# Patient Record
Sex: MALE | Race: WHITE | NOT HISPANIC OR LATINO | Employment: OTHER | URBAN - METROPOLITAN AREA
[De-identification: names, ages, dates, MRNs, and addresses within clinical notes are randomized per-mention and may not be internally consistent; named-entity substitution may affect disease eponyms.]

---

## 2017-03-15 ENCOUNTER — COMMUNICATION - HEALTHEAST (OUTPATIENT)
Dept: FAMILY MEDICINE | Facility: CLINIC | Age: 57
End: 2017-03-15

## 2017-03-15 DIAGNOSIS — I10 ESSENTIAL HYPERTENSION: ICD-10-CM

## 2017-07-18 ENCOUNTER — COMMUNICATION - HEALTHEAST (OUTPATIENT)
Dept: FAMILY MEDICINE | Facility: CLINIC | Age: 57
End: 2017-07-18

## 2017-07-18 DIAGNOSIS — I10 ESSENTIAL HYPERTENSION: ICD-10-CM

## 2017-10-10 ENCOUNTER — OFFICE VISIT - HEALTHEAST (OUTPATIENT)
Dept: FAMILY MEDICINE | Facility: CLINIC | Age: 57
End: 2017-10-10

## 2017-10-10 DIAGNOSIS — E66.3 OVERWEIGHT: ICD-10-CM

## 2017-10-10 DIAGNOSIS — K43.9 VENTRAL HERNIA: ICD-10-CM

## 2017-10-10 DIAGNOSIS — Z00.00 HEALTH MAINTENANCE EXAMINATION: ICD-10-CM

## 2017-10-10 DIAGNOSIS — I10 ESSENTIAL HYPERTENSION: ICD-10-CM

## 2017-10-10 DIAGNOSIS — E78.00 PURE HYPERCHOLESTEROLEMIA: ICD-10-CM

## 2017-10-10 LAB
CHOLEST SERPL-MCNC: 164 MG/DL
FASTING STATUS PATIENT QL REPORTED: YES
HDLC SERPL-MCNC: 52 MG/DL
LDLC SERPL CALC-MCNC: 93 MG/DL
PSA SERPL-MCNC: 0.4 NG/ML (ref 0–3.5)
TRIGL SERPL-MCNC: 97 MG/DL

## 2017-10-10 ASSESSMENT — MIFFLIN-ST. JEOR: SCORE: 1667.78

## 2017-10-10 NOTE — ASSESSMENT & PLAN NOTE
Hypertension is unchanged.  Continue current treatment regimen.  Weight loss.  Regular aerobic exercise.  Continue current medications.  Blood pressure will be reassessed at the next regular appointment.

## 2017-10-10 NOTE — ASSESSMENT & PLAN NOTE
Lipid abnormalities are unchanged.  Pharmacotherapy as ordered.  Lipids will be reassessed in 1 year.

## 2017-10-13 ENCOUNTER — AMBULATORY - HEALTHEAST (OUTPATIENT)
Dept: FAMILY MEDICINE | Facility: CLINIC | Age: 57
End: 2017-10-13

## 2017-10-13 DIAGNOSIS — E87.6 HYPOKALEMIA: ICD-10-CM

## 2017-10-19 ENCOUNTER — OFFICE VISIT - HEALTHEAST (OUTPATIENT)
Dept: SURGERY | Facility: CLINIC | Age: 57
End: 2017-10-19

## 2017-10-19 DIAGNOSIS — K42.9 UMBILICAL HERNIA WITHOUT OBSTRUCTION OR GANGRENE: ICD-10-CM

## 2017-10-19 DIAGNOSIS — M62.08 DIASTASIS RECTI: ICD-10-CM

## 2017-11-03 ENCOUNTER — AMBULATORY - HEALTHEAST (OUTPATIENT)
Dept: LAB | Facility: CLINIC | Age: 57
End: 2017-11-03

## 2017-11-03 DIAGNOSIS — E87.6 HYPOKALEMIA: ICD-10-CM

## 2018-02-06 ENCOUNTER — COMMUNICATION - HEALTHEAST (OUTPATIENT)
Dept: FAMILY MEDICINE | Facility: CLINIC | Age: 58
End: 2018-02-06

## 2018-10-17 ENCOUNTER — COMMUNICATION - HEALTHEAST (OUTPATIENT)
Dept: FAMILY MEDICINE | Facility: CLINIC | Age: 58
End: 2018-10-17

## 2018-10-17 DIAGNOSIS — E78.2 MIXED HYPERLIPIDEMIA: ICD-10-CM

## 2018-10-18 ENCOUNTER — COMMUNICATION - HEALTHEAST (OUTPATIENT)
Dept: FAMILY MEDICINE | Facility: CLINIC | Age: 58
End: 2018-10-18

## 2018-10-18 DIAGNOSIS — I10 ESSENTIAL HYPERTENSION: ICD-10-CM

## 2018-11-29 ENCOUNTER — RECORDS - HEALTHEAST (OUTPATIENT)
Dept: ADMINISTRATIVE | Facility: OTHER | Age: 58
End: 2018-11-29

## 2018-11-30 ENCOUNTER — OFFICE VISIT - HEALTHEAST (OUTPATIENT)
Dept: FAMILY MEDICINE | Facility: CLINIC | Age: 58
End: 2018-11-30

## 2018-11-30 DIAGNOSIS — E78.2 MIXED HYPERLIPIDEMIA: ICD-10-CM

## 2018-11-30 DIAGNOSIS — R73.01 IMPAIRED FASTING GLUCOSE: ICD-10-CM

## 2018-11-30 DIAGNOSIS — Z00.00 ENCOUNTER FOR ROUTINE HISTORY AND PHYSICAL EXAM FOR MALE: ICD-10-CM

## 2018-11-30 DIAGNOSIS — E78.00 PURE HYPERCHOLESTEROLEMIA: ICD-10-CM

## 2018-11-30 DIAGNOSIS — Z12.11 SCREENING FOR COLORECTAL CANCER: ICD-10-CM

## 2018-11-30 DIAGNOSIS — Z12.5 SCREENING FOR MALIGNANT NEOPLASM OF PROSTATE: ICD-10-CM

## 2018-11-30 DIAGNOSIS — I10 ESSENTIAL HYPERTENSION, BENIGN: ICD-10-CM

## 2018-11-30 DIAGNOSIS — Z12.12 SCREENING FOR COLORECTAL CANCER: ICD-10-CM

## 2018-11-30 DIAGNOSIS — K64.4 EXTERNAL HEMORRHOIDS: ICD-10-CM

## 2018-11-30 LAB
ALBUMIN SERPL-MCNC: 4.3 G/DL (ref 3.5–5)
ALP SERPL-CCNC: 46 U/L (ref 45–120)
ALT SERPL W P-5'-P-CCNC: 35 U/L (ref 0–45)
ANION GAP SERPL CALCULATED.3IONS-SCNC: 12 MMOL/L (ref 5–18)
AST SERPL W P-5'-P-CCNC: 26 U/L (ref 0–40)
BILIRUB SERPL-MCNC: 1.8 MG/DL (ref 0–1)
BUN SERPL-MCNC: 14 MG/DL (ref 8–22)
CALCIUM SERPL-MCNC: 10 MG/DL (ref 8.5–10.5)
CHLORIDE BLD-SCNC: 100 MMOL/L (ref 98–107)
CHOLEST SERPL-MCNC: 185 MG/DL
CO2 SERPL-SCNC: 28 MMOL/L (ref 22–31)
CREAT SERPL-MCNC: 0.78 MG/DL (ref 0.7–1.3)
ERYTHROCYTE [DISTWIDTH] IN BLOOD BY AUTOMATED COUNT: 12.2 % (ref 11–14.5)
FASTING STATUS PATIENT QL REPORTED: YES
GFR SERPL CREATININE-BSD FRML MDRD: >60 ML/MIN/1.73M2
GLUCOSE BLD-MCNC: 104 MG/DL (ref 70–125)
HCT VFR BLD AUTO: 47.2 % (ref 40–54)
HDLC SERPL-MCNC: 60 MG/DL
HGB BLD-MCNC: 16.4 G/DL (ref 14–18)
LDLC SERPL CALC-MCNC: 97 MG/DL
MCH RBC QN AUTO: 31.8 PG (ref 27–34)
MCHC RBC AUTO-ENTMCNC: 34.8 G/DL (ref 32–36)
MCV RBC AUTO: 91 FL (ref 80–100)
PLATELET # BLD AUTO: 237 THOU/UL (ref 140–440)
PMV BLD AUTO: 7.7 FL (ref 7–10)
POTASSIUM BLD-SCNC: 3.6 MMOL/L (ref 3.5–5)
PROT SERPL-MCNC: 7.5 G/DL (ref 6–8)
PSA SERPL-MCNC: 0.4 NG/ML (ref 0–3.5)
RBC # BLD AUTO: 5.16 MILL/UL (ref 4.4–6.2)
SODIUM SERPL-SCNC: 140 MMOL/L (ref 136–145)
TRIGL SERPL-MCNC: 141 MG/DL
WBC: 5.4 THOU/UL (ref 4–11)

## 2018-11-30 ASSESSMENT — MIFFLIN-ST. JEOR: SCORE: 1689.89

## 2018-12-03 LAB — HBA1C MFR BLD: 5.7 % (ref 3.5–6)

## 2019-03-12 ENCOUNTER — COMMUNICATION - HEALTHEAST (OUTPATIENT)
Dept: FAMILY MEDICINE | Facility: CLINIC | Age: 59
End: 2019-03-12

## 2019-04-01 ENCOUNTER — COMMUNICATION - HEALTHEAST (OUTPATIENT)
Dept: FAMILY MEDICINE | Facility: CLINIC | Age: 59
End: 2019-04-01

## 2019-04-01 DIAGNOSIS — I10 ESSENTIAL HYPERTENSION, BENIGN: ICD-10-CM

## 2019-04-03 ENCOUNTER — AMBULATORY - HEALTHEAST (OUTPATIENT)
Dept: LAB | Facility: CLINIC | Age: 59
End: 2019-04-03

## 2019-04-03 DIAGNOSIS — I10 ESSENTIAL HYPERTENSION, BENIGN: ICD-10-CM

## 2019-04-03 LAB
ANION GAP SERPL CALCULATED.3IONS-SCNC: 10 MMOL/L (ref 5–18)
BUN SERPL-MCNC: 13 MG/DL (ref 8–22)
CALCIUM SERPL-MCNC: 9.7 MG/DL (ref 8.5–10.5)
CHLORIDE BLD-SCNC: 106 MMOL/L (ref 98–107)
CO2 SERPL-SCNC: 27 MMOL/L (ref 22–31)
CREAT SERPL-MCNC: 0.89 MG/DL (ref 0.7–1.3)
GFR SERPL CREATININE-BSD FRML MDRD: >60 ML/MIN/1.73M2
GLUCOSE BLD-MCNC: 102 MG/DL (ref 70–125)
POTASSIUM BLD-SCNC: 4.1 MMOL/L (ref 3.5–5)
SODIUM SERPL-SCNC: 143 MMOL/L (ref 136–145)

## 2019-06-03 ENCOUNTER — OFFICE VISIT - HEALTHEAST (OUTPATIENT)
Dept: FAMILY MEDICINE | Facility: CLINIC | Age: 59
End: 2019-06-03

## 2019-06-03 DIAGNOSIS — I10 ESSENTIAL HYPERTENSION, BENIGN: ICD-10-CM

## 2019-06-18 ENCOUNTER — OFFICE VISIT - HEALTHEAST (OUTPATIENT)
Dept: FAMILY MEDICINE | Facility: CLINIC | Age: 59
End: 2019-06-18

## 2019-06-18 DIAGNOSIS — J30.2 SEASONAL ALLERGIC RHINITIS, UNSPECIFIED TRIGGER: ICD-10-CM

## 2019-06-18 DIAGNOSIS — R05.8 COUGH PRESENT FOR GREATER THAN 3 WEEKS: ICD-10-CM

## 2019-06-18 LAB
BASOPHILS # BLD AUTO: 0 THOU/UL (ref 0–0.2)
BASOPHILS NFR BLD AUTO: 1 % (ref 0–2)
EOSINOPHIL # BLD AUTO: 0.3 THOU/UL (ref 0–0.4)
EOSINOPHIL NFR BLD AUTO: 5 % (ref 0–6)
ERYTHROCYTE [DISTWIDTH] IN BLOOD BY AUTOMATED COUNT: 12 % (ref 11–14.5)
HCT VFR BLD AUTO: 44.2 % (ref 40–54)
HGB BLD-MCNC: 15.2 G/DL (ref 14–18)
LYMPHOCYTES # BLD AUTO: 1.8 THOU/UL (ref 0.8–4.4)
LYMPHOCYTES NFR BLD AUTO: 30 % (ref 20–40)
MCH RBC QN AUTO: 31.6 PG (ref 27–34)
MCHC RBC AUTO-ENTMCNC: 34.3 G/DL (ref 32–36)
MCV RBC AUTO: 92 FL (ref 80–100)
MONOCYTES # BLD AUTO: 0.6 THOU/UL (ref 0–0.9)
MONOCYTES NFR BLD AUTO: 10 % (ref 2–10)
NEUTROPHILS # BLD AUTO: 3.4 THOU/UL (ref 2–7.7)
NEUTROPHILS NFR BLD AUTO: 55 % (ref 50–70)
PLATELET # BLD AUTO: 246 THOU/UL (ref 140–440)
PMV BLD AUTO: 7.4 FL (ref 7–10)
RBC # BLD AUTO: 4.8 MILL/UL (ref 4.4–6.2)
WBC: 6.2 THOU/UL (ref 4–11)

## 2019-06-18 ASSESSMENT — MIFFLIN-ST. JEOR: SCORE: 1680.82

## 2019-12-15 ENCOUNTER — COMMUNICATION - HEALTHEAST (OUTPATIENT)
Dept: FAMILY MEDICINE | Facility: CLINIC | Age: 59
End: 2019-12-15

## 2019-12-15 DIAGNOSIS — E78.2 MIXED HYPERLIPIDEMIA: ICD-10-CM

## 2019-12-18 ENCOUNTER — OFFICE VISIT - HEALTHEAST (OUTPATIENT)
Dept: FAMILY MEDICINE | Facility: CLINIC | Age: 59
End: 2019-12-18

## 2019-12-18 DIAGNOSIS — E78.00 PURE HYPERCHOLESTEROLEMIA: ICD-10-CM

## 2019-12-18 DIAGNOSIS — R73.01 IMPAIRED FASTING GLUCOSE: ICD-10-CM

## 2019-12-18 DIAGNOSIS — Z12.5 SCREENING FOR PROSTATE CANCER: ICD-10-CM

## 2019-12-18 DIAGNOSIS — M77.11 LATERAL EPICONDYLITIS OF RIGHT ELBOW: ICD-10-CM

## 2019-12-18 DIAGNOSIS — Z00.00 HEALTHCARE MAINTENANCE: ICD-10-CM

## 2019-12-18 DIAGNOSIS — I10 ESSENTIAL HYPERTENSION, BENIGN: ICD-10-CM

## 2019-12-18 DIAGNOSIS — E66.3 OVERWEIGHT: ICD-10-CM

## 2019-12-18 DIAGNOSIS — E88.810 METABOLIC SYNDROME X: ICD-10-CM

## 2019-12-18 DIAGNOSIS — Z23 NEED FOR SHINGLES VACCINE: ICD-10-CM

## 2019-12-18 DIAGNOSIS — E78.2 MIXED HYPERLIPIDEMIA: ICD-10-CM

## 2019-12-18 LAB
ALBUMIN SERPL-MCNC: 4.1 G/DL (ref 3.5–5)
ALP SERPL-CCNC: 56 U/L (ref 45–120)
ALT SERPL W P-5'-P-CCNC: 31 U/L (ref 0–45)
ANION GAP SERPL CALCULATED.3IONS-SCNC: 10 MMOL/L (ref 5–18)
AST SERPL W P-5'-P-CCNC: 23 U/L (ref 0–40)
BILIRUB SERPL-MCNC: 1.7 MG/DL (ref 0–1)
BUN SERPL-MCNC: 14 MG/DL (ref 8–22)
CALCIUM SERPL-MCNC: 9.2 MG/DL (ref 8.5–10.5)
CHLORIDE BLD-SCNC: 105 MMOL/L (ref 98–107)
CHOLEST SERPL-MCNC: 179 MG/DL
CO2 SERPL-SCNC: 25 MMOL/L (ref 22–31)
CREAT SERPL-MCNC: 0.9 MG/DL (ref 0.7–1.3)
FASTING STATUS PATIENT QL REPORTED: YES
GFR SERPL CREATININE-BSD FRML MDRD: >60 ML/MIN/1.73M2
GLUCOSE BLD-MCNC: 111 MG/DL (ref 70–125)
HBA1C MFR BLD: 5.9 % (ref 3.5–6)
HDLC SERPL-MCNC: 60 MG/DL
LDLC SERPL CALC-MCNC: 93 MG/DL
POTASSIUM BLD-SCNC: 3.8 MMOL/L (ref 3.5–5)
PROT SERPL-MCNC: 7 G/DL (ref 6–8)
PSA SERPL-MCNC: 0.5 NG/ML (ref 0–3.5)
SODIUM SERPL-SCNC: 140 MMOL/L (ref 136–145)
TRIGL SERPL-MCNC: 131 MG/DL

## 2019-12-18 ASSESSMENT — MIFFLIN-ST. JEOR: SCORE: 1704.41

## 2019-12-20 ENCOUNTER — COMMUNICATION - HEALTHEAST (OUTPATIENT)
Dept: FAMILY MEDICINE | Facility: CLINIC | Age: 59
End: 2019-12-20

## 2020-06-16 ENCOUNTER — COMMUNICATION - HEALTHEAST (OUTPATIENT)
Dept: FAMILY MEDICINE | Facility: CLINIC | Age: 60
End: 2020-06-16

## 2020-06-16 DIAGNOSIS — E78.2 MIXED HYPERLIPIDEMIA: ICD-10-CM

## 2020-12-01 ENCOUNTER — OFFICE VISIT - HEALTHEAST (OUTPATIENT)
Dept: FAMILY MEDICINE | Facility: CLINIC | Age: 60
End: 2020-12-01

## 2020-12-01 DIAGNOSIS — Z12.5 SCREENING FOR PROSTATE CANCER: ICD-10-CM

## 2020-12-01 DIAGNOSIS — Z12.11 SCREEN FOR COLON CANCER: ICD-10-CM

## 2020-12-01 DIAGNOSIS — R73.01 IMPAIRED FASTING GLUCOSE: ICD-10-CM

## 2020-12-01 DIAGNOSIS — Z11.59 ENCOUNTER FOR HEPATITIS C SCREENING TEST FOR LOW RISK PATIENT: ICD-10-CM

## 2020-12-01 DIAGNOSIS — Z80.7 FAMILY HISTORY OF LYMPHOMA: ICD-10-CM

## 2020-12-01 DIAGNOSIS — E78.00 PURE HYPERCHOLESTEROLEMIA: ICD-10-CM

## 2020-12-01 DIAGNOSIS — Z00.01 ENCOUNTER FOR GENERAL ADULT MEDICAL EXAMINATION WITH ABNORMAL FINDINGS: ICD-10-CM

## 2020-12-01 DIAGNOSIS — Z13.220 LIPID SCREENING: ICD-10-CM

## 2020-12-01 DIAGNOSIS — I10 ESSENTIAL HYPERTENSION, BENIGN: ICD-10-CM

## 2020-12-01 LAB
ALBUMIN SERPL-MCNC: 4.3 G/DL (ref 3.5–5)
ALP SERPL-CCNC: 52 U/L (ref 45–120)
ALT SERPL W P-5'-P-CCNC: 28 U/L (ref 0–45)
ANION GAP SERPL CALCULATED.3IONS-SCNC: 11 MMOL/L (ref 5–18)
AST SERPL W P-5'-P-CCNC: 21 U/L (ref 0–40)
BILIRUB SERPL-MCNC: 1.7 MG/DL (ref 0–1)
BUN SERPL-MCNC: 13 MG/DL (ref 8–22)
CALCIUM SERPL-MCNC: 9.1 MG/DL (ref 8.5–10.5)
CHLORIDE BLD-SCNC: 108 MMOL/L (ref 98–107)
CHOLEST SERPL-MCNC: 173 MG/DL
CO2 SERPL-SCNC: 22 MMOL/L (ref 22–31)
CREAT SERPL-MCNC: 0.91 MG/DL (ref 0.7–1.3)
ERYTHROCYTE [DISTWIDTH] IN BLOOD BY AUTOMATED COUNT: 12.2 % (ref 11–14.5)
FASTING STATUS PATIENT QL REPORTED: YES
GFR SERPL CREATININE-BSD FRML MDRD: >60 ML/MIN/1.73M2
GLUCOSE BLD-MCNC: 110 MG/DL (ref 70–125)
HBA1C MFR BLD: 5.8 %
HCT VFR BLD AUTO: 45 % (ref 40–54)
HDLC SERPL-MCNC: 64 MG/DL
HGB BLD-MCNC: 15.4 G/DL (ref 14–18)
LDLC SERPL CALC-MCNC: 82 MG/DL
MCH RBC QN AUTO: 31.8 PG (ref 27–34)
MCHC RBC AUTO-ENTMCNC: 34.2 G/DL (ref 32–36)
MCV RBC AUTO: 93 FL (ref 80–100)
PLATELET # BLD AUTO: 244 THOU/UL (ref 140–440)
PMV BLD AUTO: 7.3 FL (ref 7–10)
POTASSIUM BLD-SCNC: 4 MMOL/L (ref 3.5–5)
PROT SERPL-MCNC: 6.9 G/DL (ref 6–8)
PSA SERPL-MCNC: 0.5 NG/ML (ref 0–3.5)
RBC # BLD AUTO: 4.83 MILL/UL (ref 4.4–6.2)
SODIUM SERPL-SCNC: 141 MMOL/L (ref 136–145)
TRIGL SERPL-MCNC: 136 MG/DL
WBC: 4.9 THOU/UL (ref 4–11)

## 2020-12-01 ASSESSMENT — MIFFLIN-ST. JEOR: SCORE: 1700.78

## 2020-12-02 LAB — HCV AB SERPL QL IA: NEGATIVE

## 2021-02-22 ENCOUNTER — COMMUNICATION - HEALTHEAST (OUTPATIENT)
Dept: FAMILY MEDICINE | Facility: CLINIC | Age: 61
End: 2021-02-22

## 2021-02-22 DIAGNOSIS — I10 ESSENTIAL HYPERTENSION, BENIGN: ICD-10-CM

## 2021-03-13 ENCOUNTER — COMMUNICATION - HEALTHEAST (OUTPATIENT)
Dept: FAMILY MEDICINE | Facility: CLINIC | Age: 61
End: 2021-03-13

## 2021-03-13 DIAGNOSIS — E78.2 MIXED HYPERLIPIDEMIA: ICD-10-CM

## 2021-03-13 RX ORDER — ATORVASTATIN CALCIUM 20 MG/1
TABLET, FILM COATED ORAL
Qty: 90 TABLET | Refills: 2 | Status: SHIPPED | OUTPATIENT
Start: 2021-03-13 | End: 2021-07-30

## 2021-04-22 ENCOUNTER — AMBULATORY - HEALTHEAST (OUTPATIENT)
Dept: NURSING | Facility: CLINIC | Age: 61
End: 2021-04-22

## 2021-05-13 ENCOUNTER — COMMUNICATION - HEALTHEAST (OUTPATIENT)
Dept: FAMILY MEDICINE | Facility: CLINIC | Age: 61
End: 2021-05-13

## 2021-05-13 ENCOUNTER — AMBULATORY - HEALTHEAST (OUTPATIENT)
Dept: NURSING | Facility: CLINIC | Age: 61
End: 2021-05-13

## 2021-05-13 DIAGNOSIS — I10 ESSENTIAL HYPERTENSION, BENIGN: ICD-10-CM

## 2021-05-14 RX ORDER — LOSARTAN POTASSIUM AND HYDROCHLOROTHIAZIDE 12.5; 5 MG/1; MG/1
1 TABLET ORAL DAILY
Qty: 90 TABLET | Refills: 2 | Status: SHIPPED | OUTPATIENT
Start: 2021-05-14 | End: 2021-07-30

## 2021-05-27 NOTE — TELEPHONE ENCOUNTER
Refill Approved    Rx renewed per Medication Renewal Policy. Medication was last renewed on 11/30/2018.           Vazquez Jorgensen, [facility] Triage/Med Refill 4/2/2019     Requested Prescriptions   Pending Prescriptions Disp Refills     lisinopril-hydrochlorothiazide (PRINZIDE,ZESTORETIC) 10-12.5 mg per tablet [Pharmacy Med Name: LISINOPRIL-HCTZ 10/12.5MG TABLETS] 30 tablet 0     Sig: TAKE 1 TABLET BY MOUTH DAILY    Diuretics/Combination Diuretics Refill Protocol  Passed - 4/1/2019  5:21 PM       Passed - Visit with PCP or prescribing provider visit in past 12 months    Last office visit with prescriber/PCP: 9/9/2016 Carleen Alan DO OR same dept: Visit date not found OR same specialty: 9/9/2016 Carleen Alan DO  Last physical: 11/30/2018 Last MTM visit: Visit date not found   Next visit within 3 mo: Visit date not found  Next physical within 3 mo: Visit date not found  Prescriber OR PCP: Carleen Alan DO  Last diagnosis associated with med order: There are no diagnoses linked to this encounter.  If protocol passes may refill for 12 months if within 3 months of last provider visit (or a total of 15 months).            Passed - Serum Potassium in past 12 months     Lab Results   Component Value Date    Potassium 3.6 11/30/2018            Passed - Serum Sodium in past 12 months     Lab Results   Component Value Date    Sodium 140 11/30/2018            Passed - Blood pressure on file in past 12 months    BP Readings from Last 1 Encounters:   03/15/19 126/83            Passed - Serum Creatinine in past 12 months     Creatinine   Date Value Ref Range Status   11/30/2018 0.78 0.70 - 1.30 mg/dL Final

## 2021-05-29 NOTE — PROGRESS NOTES
Assessment:     1. Cough present for greater than 3 weeks  HM1(CBC and Differential)    XR Chest 2 Views    HM1 (CBC with Diff)   2. Seasonal allergic rhinitis, unspecified trigger  loratadine (CLARITIN) 10 mg tablet    fluticasone propionate (FLONASE) 50 mcg/actuation nasal spray     An x-ray was done and reviewed personally by me.  She does not show any acute changes.    Suspicion that this must be seasonal allergies as patient has had some intermittent symptoms starting from March.  At this time trial of allergy medication and nasal spray as above.      Plan:      The diagnosis was discussed with the patient and evaluation and treatment plans outlined.     Subjective:      Kirby Mandujano is a  male who presents for evaluation of   Chief Complaint   Patient presents with     Cough     Still has cough, X1 month, started BP medications and has concerns      Patient has had cough for the last few months.  In the month of November his medication for blood pressure were changed.  Lisinopril was added to his regimen.  He believes this has been the culprit medication causing chronic cough.  Denies any fever, loss of weight or appetite.  Otherwise feels good.     Since I last saw him 2 weeks ago, his cough is now dry and hacking and sometimes he goes into spasms where he continues to have hacking episodes.  This is usually nonproductive.  At the end of the cough occasionally he might hear a wheeze.    His lisinopril was changed to losartan at the last visit.    The following portions of the patient's history were reviewed and updated as appropriate: allergies, current medications, past family history, past medical history, past social history, past surgical history and problem list.  No Known Allergies    Current Outpatient Medications on File Prior to Visit   Medication Sig Dispense Refill     aspirin 81 MG EC tablet Take 81 mg by mouth daily.       atorvastatin (LIPITOR) 20 MG tablet TAKE 1 TABLET(20 MG) BY MOUTH  AT BEDTIME. 90 tablet 4     losartan-hydrochlorothiazide (HYZAAR) 50-12.5 mg per tablet Take 1 tablet by mouth daily. 30 tablet 11     No current facility-administered medications on file prior to visit.        Patient Active Problem List   Diagnosis     Hypercholesterolemia     Benign Essential Hypertension     Overweight     External hemorrhoids     Impaired fasting glucose       Past Medical History:   Diagnosis Date     Hernia, ventral 10/19/2017       Past Surgical History:   Procedure Laterality Date     [place] REMOVAL OF SPERM DUCT(S)      Description: Surgery Of Male Genitalia Vasectomy;  Recorded: 11/17/2008;     WISDOM TOOTH EXTRACTION         Family History   Problem Relation Age of Onset     Hypertension Mother      Prostate cancer Father      Hypertension Father      Diabetes type II Son        Social History     Socioeconomic History     Marital status: Single     Spouse name:       Number of children: None     Years of education: None     Highest education level: None   Occupational History     Occupation:  manager      Employer: [employer]     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   Social Needs     Financial resource strain: None     Food insecurity:     Worry: None     Inability: None     Transportation needs:     Medical: None     Non-medical: None   Tobacco Use     Smoking status: Never Smoker     Smokeless tobacco: Never Used   Substance and Sexual Activity     Alcohol use: Yes     Alcohol/week: 1.0 oz     Types: 2 Standard drinks or equivalent per week     Drug use: No     Sexual activity: Yes     Partners: Female   Lifestyle     Physical activity:     Days per week: None     Minutes per session: None     Stress: None   Relationships     Social connections:     Talks on phone: None     Gets together: None     Attends Christian service: None     Active member of club or organization: None     Attends meetings of clubs or organizations: None     Relationship status: None     Intimate partner violence:      "Fear of current or ex partner: None     Emotionally abused: None     Physically abused: None     Forced sexual activity: None   Other Topics Concern     None   Social History Narrative               Review of Systems  Constitutional: negative  Cardiovascular: negative  Gastrointestinal: negative       Objective:   /83 (Patient Site: Left Arm, Patient Position: Sitting, Cuff Size: Adult Large)   Pulse (!) 55   Ht 5' 9\" (1.753 m)   Wt 193 lb (87.5 kg)   SpO2 96%   BMI 28.50 kg/m      General:Healthy, alert and in no acute distress  Head:  NCAT w/o lesions or tenderness  Eyes: conjunctivae/corneas clear. PERRL, EOM's intact. Fundi benign  Ears: normal TM's and external ear canals bilateral  Sinus tender: negative  Nose: Slightly turbinates  Mouth: lips, mucosa, and tongue normal. Teeth and gums normal. No tonsillar endangerment or  erythema of pharynx, some postnasal drip is noted.  Neck: supple, symmetrical, trachea midline.  Lungs: clear to auscultation bilaterally  Heart: RRR, No murmurs    "

## 2021-05-29 NOTE — PROGRESS NOTES
Assessment:     1. Benign Essential Hypertension  losartan-hydrochlorothiazide (HYZAAR) 50-12.5 mg per tablet     Likely from lisinopril.  However I discussed with the patient that if his symptoms do not resolve the need to do further work-up.  Patient is agreeable     Plan:      The diagnosis was discussed with the patient and evaluation and treatment plans outlined.     Subjective:      Kirby Mandujano is a  male who presents for evaluation of   Chief Complaint   Patient presents with     Cough     Pt here today to discuss persistant cough- Pt believes could be related to a medication he is taking     Patient has had cough for the last few months.  In the month of November his medication for blood pressure were changed.  Lisinopril was added to his regimen.  He believes this has been the culprit medication causing chronic cough.  Denies any fever, loss of weight or appetite.  Otherwise feels good.  Cough is more like clearing of throat.    The following portions of the patient's history were reviewed and updated as appropriate: allergies, current medications, past family history, past medical history, past social history, past surgical history and problem list.  No Known Allergies    Current Outpatient Medications on File Prior to Visit   Medication Sig Dispense Refill     aspirin 81 MG EC tablet Take 81 mg by mouth daily.       atorvastatin (LIPITOR) 20 MG tablet TAKE 1 TABLET(20 MG) BY MOUTH AT BEDTIME. 90 tablet 4     No current facility-administered medications on file prior to visit.        Patient Active Problem List   Diagnosis     Hypercholesterolemia     Benign Essential Hypertension     Overweight     External hemorrhoids     Impaired fasting glucose       Past Medical History:   Diagnosis Date     Hernia, ventral 10/19/2017       Past Surgical History:   Procedure Laterality Date     [place] REMOVAL OF SPERM DUCT(S)      Description: Surgery Of Male Genitalia Vasectomy;  Recorded: 11/17/2008;     WISDOM  TOOTH EXTRACTION         Family History   Problem Relation Age of Onset     Hypertension Mother      Prostate cancer Father      Hypertension Father      Diabetes type II Son        Social History     Socioeconomic History     Marital status: Single     Spouse name:       Number of children: None     Years of education: None     Highest education level: None   Occupational History     Occupation:  manager      Employer: [employer]     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   Social Needs     Financial resource strain: None     Food insecurity:     Worry: None     Inability: None     Transportation needs:     Medical: None     Non-medical: None   Tobacco Use     Smoking status: Never Smoker     Smokeless tobacco: Never Used   Substance and Sexual Activity     Alcohol use: Yes     Alcohol/week: 1.0 oz     Types: 2 Standard drinks or equivalent per week     Drug use: No     Sexual activity: Yes     Partners: Female   Lifestyle     Physical activity:     Days per week: None     Minutes per session: None     Stress: None   Relationships     Social connections:     Talks on phone: None     Gets together: None     Attends Congregational service: None     Active member of club or organization: None     Attends meetings of clubs or organizations: None     Relationship status: None     Intimate partner violence:     Fear of current or ex partner: None     Emotionally abused: None     Physically abused: None     Forced sexual activity: None   Other Topics Concern     None   Social History Narrative     None       Review of Systems  Constitutional: negative  Cardiovascular: negative  Gastrointestinal: negative       Objective:   /77 (Patient Site: Left Arm, Patient Position: Sitting, Cuff Size: Adult Large)   Pulse (!) 55   Resp 16   Wt 193 lb 9.6 oz (87.8 kg)   SpO2 98%   BMI 28.59 kg/m       General:Healthy, alert and in no acute distress  Head:  NCAT w/o lesions or tenderness  Eyes: conjunctivae/corneas clear. PERRL, EOM's  intact. Fundi benign  Ears: normal TM's and external ear canals bilateral  Sinus tender: negative  Nose: Normal  turbinates  Mouth: lips, mucosa, and tongue normal. Teeth and gums normal. No tonsillar endangerment or  erythema of pharynx  Neck: supple, symmetrical, trachea midline.  Lungs: clear to auscultation bilaterally  Heart: RRR, No murmurs

## 2021-05-31 VITALS — WEIGHT: 192.4 LBS | BODY MASS INDEX: 28.41 KG/M2

## 2021-05-31 VITALS — BODY MASS INDEX: 28.16 KG/M2 | WEIGHT: 190.13 LBS | HEIGHT: 69 IN

## 2021-06-02 VITALS — HEIGHT: 69 IN | BODY MASS INDEX: 28.88 KG/M2 | WEIGHT: 195 LBS

## 2021-06-03 VITALS — BODY MASS INDEX: 28.59 KG/M2 | WEIGHT: 193.6 LBS

## 2021-06-03 VITALS — WEIGHT: 193 LBS | BODY MASS INDEX: 28.58 KG/M2 | HEIGHT: 69 IN

## 2021-06-04 VITALS
HEART RATE: 57 BPM | SYSTOLIC BLOOD PRESSURE: 145 MMHG | HEIGHT: 69 IN | DIASTOLIC BLOOD PRESSURE: 86 MMHG | BODY MASS INDEX: 29.36 KG/M2 | RESPIRATION RATE: 20 BRPM | WEIGHT: 198.2 LBS | TEMPERATURE: 96.9 F
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
## 2021-06-04 NOTE — PATIENT INSTRUCTIONS - HE
--Sleep 7-8 hours of uninterrupted sleep every night  --Decrease chronic stress, or increase stress reducing activities  --Eat real foods that are high in natural fat (meats, coconut oil, butter, natural nut butters, nuts, avocados, full fat dairy)  --Eat real foods that are fermented (Full fat yogurt, Pb kraut, kombucha, kimichi, pickles)  --Eat real foods that  are high in fiber (fruits, vegetables). (Berries and green leafy veggies)  --Avoid or eliminate refined processed carbohydrates (foods with added sugar, ice cream, deserts, chips, crackers, bread, tortillas, bagels, pasta, junk food, and fast food). 50-70 grams of carbs/day  --Avoid or eliminate sugar sweetened beverages (pop, sports drinks, sweetened tea or coffee, juice)  --Find low carb substitutes for bread, pasta, deserts (zoodles, fathead pizza, spaghetti squash, low carb bread oopsie bread, cauliflower rice)  --Consider having a fasting period of 12 hours every day.  --Walk 30 minutes daily      Apps  Carb Manager: Keto diet tracker and macros counter  ItrackBites: diet Tracker and weight loss diary  Calorie, carb & Fat counter  KetoManager: Low Carb Diet Tracker, Macro counter      Books:  The Obesity Code by Dr Jean Paul Finnegan  The Diabetes Code by Dr Jean Paul Finnegan  The Case Against Sugar by Lucian Díaz  Why We Get Fat by Lcuian Díaz  Always Hungry by Dr Mario Villasenor  Fat Chance by Dr Jeremi Mchugh Clarity by Dr Flakito Carey  The Art and Science of Low Carbohydrate Living by Herbie De Luna and Pan Cantrell      Websites  Dietdoctor.com  ketonutrition.org  Zoeharcombe.com  precisionnutrition.com  2ketodudes.com    Podcasts  The Obesity Code podcast  2Ketodudes  Obesity: A disease  The Drive Gavin Bergman    YouTube  Dr Gavin Bergman: Readdressing Dietary Guidelines  Gavin Bergman: What if we are wrong about Diabetes? (Dragan Talk)  Dr Jean Paul Finnegan The Aetiolgy of Obesity  Diet Doctor Podcast   Sincere Graham (Dragan Talk)

## 2021-06-04 NOTE — TELEPHONE ENCOUNTER
Refill Approved    Rx renewed per Medication Renewal Policy. Medication was last renewed on 12/1/18.    Leena Khan, [facility] Triage/Med Refill 12/16/2019     Requested Prescriptions   Pending Prescriptions Disp Refills     atorvastatin (LIPITOR) 20 MG tablet [Pharmacy Med Name: ATORVASTATIN 20MG TABLETS] 90 tablet 0     Sig: TAKE 1 TABLET BY MOUTH EVERY NIGHT AT BEDTIME       Statins Refill Protocol (Hmg CoA Reductase Inhibitors) Passed - 12/15/2019 11:19 AM        Passed - PCP or prescribing provider visit in past 12 months      Last office visit with prescriber/PCP: 9/9/2016 Carleen Alan DO OR same dept: 6/18/2019 Ester Rich MD OR same specialty: 6/18/2019 Ester Rich MD  Last physical: 11/30/2018 Last MTM visit: Visit date not found   Next visit within 3 mo: Visit date not found  Next physical within 3 mo: Visit date not found  Prescriber OR PCP: Carleen Alan DO  Last diagnosis associated with med order: 1. Mixed hyperlipidemia  - atorvastatin (LIPITOR) 20 MG tablet [Pharmacy Med Name: ATORVASTATIN 20MG TABLETS]; TAKE 1 TABLET BY MOUTH EVERY NIGHT AT BEDTIME  Dispense: 90 tablet; Refill: 0    If protocol passes may refill for 12 months if within 3 months of last provider visit (or a total of 15 months).

## 2021-06-05 VITALS
BODY MASS INDEX: 29.24 KG/M2 | OXYGEN SATURATION: 97 % | HEART RATE: 52 BPM | RESPIRATION RATE: 14 BRPM | HEIGHT: 69 IN | SYSTOLIC BLOOD PRESSURE: 118 MMHG | DIASTOLIC BLOOD PRESSURE: 76 MMHG | TEMPERATURE: 98.2 F | WEIGHT: 197.4 LBS

## 2021-06-08 NOTE — TELEPHONE ENCOUNTER
Refill Approved    Rx renewed per Medication Renewal Policy. Medication was last renewed on 12/18/19.    Leena Khan, [facility] Triage/Med Refill 6/18/2020     Requested Prescriptions   Pending Prescriptions Disp Refills     atorvastatin (LIPITOR) 20 MG tablet [Pharmacy Med Name: ATORVASTATIN 20MG TABLETS] 90 tablet 3     Sig: TAKE 1 TABLET BY MOUTH EVERY NIGHT AT BEDTIME       Statins Refill Protocol (Hmg CoA Reductase Inhibitors) Passed - 6/16/2020  1:52 PM        Passed - PCP or prescribing provider visit in past 12 months      Last office visit with prescriber/PCP: 9/9/2016 Carleen Alan DO OR same dept: 6/18/2019 Ester Rich MD OR same specialty: 6/18/2019 Ester Rich MD  Last physical: 11/30/2018 Last MTM visit: Visit date not found   Next visit within 3 mo: Visit date not found  Next physical within 3 mo: Visit date not found  Prescriber OR PCP: Carleen Alan DO  Last diagnosis associated with med order: 1. Mixed hyperlipidemia  - atorvastatin (LIPITOR) 20 MG tablet [Pharmacy Med Name: ATORVASTATIN 20MG TABLETS]; TAKE 1 TABLET BY MOUTH EVERY NIGHT AT BEDTIME  Dispense: 90 tablet; Refill: 3    If protocol passes may refill for 12 months if within 3 months of last provider visit (or a total of 15 months).

## 2021-06-13 NOTE — PROGRESS NOTES
HPI:  I am consulted by Carleen Alan DO in this 57 y.o. male regarding a possible ventral hernia. He has noted this for the past few months. He has a bulge.  This is painless.    No Known Allergies      Current Outpatient Prescriptions:      aspirin 81 MG EC tablet, Take 81 mg by mouth daily., Disp: , Rfl:      atorvastatin (LIPITOR) 20 MG tablet, Take 1 tablet (20 mg total) by mouth at bedtime., Disp: 90 tablet, Rfl: 3     chlorthalidone (HYGROTEN) 25 MG tablet, TAKE 1 TABLET BY MOUTH DAILY., Disp: 90 tablet, Rfl: 3     potassium chloride SA (K-DUR,KLOR-CON) 20 MEQ tablet, Take 1 tablet (20 mEq total) by mouth daily for 10 days., Disp: 10 tablet, Rfl: 0    Past Medical History:   Diagnosis Date     Hernia, ventral 10/19/2017       Past Surgical History:   Procedure Laterality Date     SC REMOVAL OF SPERM DUCT(S)      Description: Surgery Of Male Genitalia Vasectomy;  Recorded: 11/17/2008;     WISDOM TOOTH EXTRACTION         Social History     Social History     Marital status: Single     Spouse name:       Number of children: N/A     Years of education: N/A     Occupational History      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    EXAM:  GENERAL: Well developed male in no distress.  CARDIAC: RRR w/out murmur   CHEST/LUNG: Clear  ABDOMEN:  No evidence of a ventral hernia.  He has a diastasis recti.   Abdomen soft and nontender.  Small umbilical  hernia.        Assessment/Plan: This patient has a small  umbilical hernia, and a diastases recti.  Neither of these need intervention.  I discussed these both with him and milka him diagrams.  If his umbilical hernia becomes symptomatic, we will repair it at that time.  He will contact us then but otherwise he does not need to return to our clinic.      Gavin Andersen MD  [facility] Department of Surgery

## 2021-06-13 NOTE — PROGRESS NOTES
ASSESSMENT:  1. Health maintenance examination     2. Essential hypertension  Lipid [place] FASTING    PSA, Annual Screen (Prostatic-Specific Antigen)    Comprehensive Metabolic Panel    chlorthalidone (HYGROTEN) 25 MG tablet   3. Hypercholesterolemia     4. Overweight     5. Ventral hernia  Ambulatory referral to General Surgery       PLAN:  Encouraged dietary improvements and weight loss. This should also aid in decreasing BP slightly. PSA screening today. Hernia on exam, patient has noticed for several months. Given referral for surgery evaluation.     Benign Essential Hypertension  Hypertension is unchanged.  Continue current treatment regimen.  Weight loss.  Regular aerobic exercise.  Continue current medications.  Blood pressure will be reassessed at the next regular appointment.    Hypercholesterolemia  Lipid abnormalities are unchanged.  Pharmacotherapy as ordered.  Lipids will be reassessed in 1 year.    MALE ADULT PREVENTIVE EXAM    CHIEF COMPLAINT:  Male preventive exam.    HISTORY OF PRESENT ILLNESS:  Kirby Mandujano is a 57 y.o. male.  He last saw me Visit date not found.  He is feeling well without concerns today with exception of a bulge in his abdomen that he has noticed for the past couple of months when he is laying in certain positions or flexing the abdomen.  It is not painful or bothersome, he is just wondering if it needs further evaluation.  Patient is fasting today for labs, otherwise feeling well.  Needing refills on medications today.  He has not been exercising except for outdoor activities and yard work, he states he knows he needs to tighten up his diet slightly.  No other questions or concerns today.  Declines flu shot.    He  has no past medical history on file.    Lab Results   Component Value Date    WBC 4.9 11/10/2015    HGB 16.1 11/10/2015    HCT 46.1 11/10/2015    MCV 93 11/10/2015     11/10/2015     09/09/2016    K 3.9 09/09/2016    BUN 17 09/09/2016     Lab Results    Component Value Date    CHOL 173 09/09/2016    HDL 54 09/09/2016    LDLCALC 97 09/09/2016    TRIG 109 09/09/2016     Lab Results   Component Value Date    PSA 0.6 09/09/2016     No results found for: TSH  BP Readings from Last 3 Encounters:   10/10/17 144/88   09/09/16 130/88   11/10/15 120/86       Surgeries:    Past Surgical History:   Procedure Laterality Date     AR REMOVAL OF SPERM DUCT(S)      Description: Surgery Of Male Genitalia Vasectomy;  Recorded: 11/17/2008;       Family History:  His family history includes Diabetes type II in his son; Hypertension in his father and mother; Prostate cancer in his father.    Social History:  He  reports that he has never smoked. He does not have any smokeless tobacco history on file. He reports that he drinks about 1.0 oz of alcohol per week  He reports that he does not use illicit drugs.    Medications:    Current Outpatient Prescriptions:      aspirin 81 MG EC tablet, Take 81 mg by mouth daily., Disp: , Rfl:      atorvastatin (LIPITOR) 20 MG tablet, Take 1 tablet (20 mg total) by mouth at bedtime., Disp: 90 tablet, Rfl: 3     chlorthalidone (HYGROTEN) 25 MG tablet, TAKE 1 TABLET BY MOUTH DAILY., Disp: 90 tablet, Rfl: 3  HELD MEDICATIONS: None.    Allergies:  No latex allergies.  No Known Allergies    RISK BEHAVIORS AND HEALTH HABITS:  PSA: The natural history of prostate cancer and ongoing controversy regarding screening and potential treatment outcomes of prostate cancer has been discussed with the patient. The meaning of a false positive PSA and a false negative PSA has been discussed. He indicates understanding of the limitations of this screening test.  Seat Belt Use: YES  Calcium intake/Osteoporosis prevention: YES  Guns: NO  Sun Screen: YES  Dental Care: YES    REVIEW OF SYSTEMS:  Complete head to toe review of systems is otherwise negative except as above.    OBJECTIVE:  VITAL SIGNS:    Vitals:    10/10/17 0807   BP: 144/88   Pulse: 66   Resp: 16   Temp: 98.5   F (36.9  C)     GENERAL:  Patient alert, in NAD  EYES: PERRLA. Extraoccular movements intact, pupils equal, reactive to light and accommodation.  Normal conjunctiva and lids.  Undilated fudoscopic exam normal, including normal size, appearance cup-to-disc ratio.  Normal posterior segments, including no exudates or hemorrhages.  ENT:  Hearing grossly normal.  Normal appearance to ears and nose.  Bilateral TM s, external canals, oropharynx normal. Normal lips, gums and teeth.  Normal nasal mucosa, septum and turbinates.  NECK:  Supple, without thyromegaly or mass.  RESP:  Clear to auscultation without crackles, wheezes or distress.  Normal respiratory effort.   BREASTS:  Nontender, without masses, nipple discharge, erythema, or axillary adenopathy.  CV:  Regular rate and rhythm without murmurs, rubs or gallops.  Normal carotid, abdominal aorta, femoral and pedal pulses.  No varicosities or edema.  ABDOMEN:  Soft, non-tender, without hepatosplenomegaly, midline ventral hernia with abdominal flexion, nontender.  :  Normal scrotum.  Penis circumcised without lesions or discharge.  LYMPHATIC: Normal palpation of neck, groin and axilla..  No lymphadenopathy.  No bruising.  NEURO:  CN II-XII intact, motor & sensory function all intact.  DTR and reflexes normal.  PSYCHIATRIC:  Alert & oriented with normal mood and affect.  Good judgment and insight.  SKIN:  Normal inspection and palpation.  MUSCULOSKELETAL: Normal gait and station.  - Spine / Ribs / Pelvis: Normal inspection, ROM, stability and strength: Spine, Head, Neck, Upper and Lower Extremities.    General  Immunizations reviewed and updated .  Alcohol use, safety and moderation discussed.  Recommended adequate calcium intake/osteoporosis prevention.  Discussed colon cancer screening at age 50, 45 if -American.  Diet and exercise reviewed, including goal of aerobic exercise 30-90 minutes most days of the week, moderation of portions sizes, avoiding eating out  and fast food and increase in fruits and vegetables.  Discussed safe sex practices.  Discussed & recommended seat belt (& motorcycle helmet) use.  Discussed & recommended smoke detector.  Discussed sun protection.  Discussed weight management.    Nicole Trejo CNP    This note has been dictated using voice recognition software. Any grammatical or context distortions are unintentional and inherent to the software

## 2021-06-13 NOTE — PATIENT INSTRUCTIONS - HE
Bring us copy of advance care directive.     -20-30 g fiber a day recommended for soft BM - for some people consider metamucil and increased fluids

## 2021-06-13 NOTE — PROGRESS NOTES
MALE ADULT PREVENTIVE EXAM    CHIEF COMPLAINT:  Male preventive exam.    HISTORY OF PRESENT ILLNESS:  Kirby Mandujano is a 60 y.o. male.  Patient is here for annual physical and has no major concerns today.  He has a history of hyperlipidemia and hypertension and he brings in  a list of his blood pressure readings wh when ich are mostly normal in the 110-130s with normal diastolic readings.  There have been 2 instances where they were up in the 140s.  He is tolerating Hyzaar without complications.  Denies any dizziness.  Blood pressure is elevated.  No headaches.     Working from home.   Notices high if eyes hard to focus   Tolerating the statin-No leg cramps.   For exercise enough to work-up a sweat.  Has a history of prediabetes and insulin intolerance does things around house of yard.  Diet consists of high carbohydrate cereal and juice for breakfast, sandwich for lunch and a cookie, meat and potatoes for dinner.  Youngest type 1 diabetes .  Other son had non-Hodgkin's lymphoma family history of prostate cancer but he does not have any urinary symptoms.  Passing on flu shot.  Indicates he got sicker the 2 years that he had it.  Weight has been stable.  We discussed his bloody stools which he has somewhat every fall.  His symptoms sound like a fissure or rectal hemorrhoids and is only on the toilet paper with some tearing sensation.  Denies any harder stools or indicates that they have not changed in caliber.  He does not get a lot of fiber in his diet so we talked about that.  He is due for colonoscopy in 2022 but we talked about having it done sooner.  He  has a past medical history of Hernia, ventral (10/19/2017).    Lab Results   Component Value Date    WBC 6.2 06/18/2019    HGB 15.2 06/18/2019    HCT 44.2 06/18/2019    MCV 92 06/18/2019     06/18/2019     12/18/2019    K 3.8 12/18/2019    BUN 14 12/18/2019     Lab Results   Component Value Date    CHOL 179 12/18/2019    HDL 60 12/18/2019     LDLCALC 93 12/18/2019    TRIG 131 12/18/2019     Lab Results   Component Value Date    PSA 0.5 12/18/2019     No results found for: TSH  BP Readings from Last 3 Encounters:   12/01/20 118/76   12/18/19 145/86   06/18/19 135/83       Surgeries:    Past Surgical History:   Procedure Laterality Date     NE REMOVAL OF SPERM DUCT(S)      Description: Surgery Of Male Genitalia Vasectomy;  Recorded: 11/17/2008;     WISDOM TOOTH EXTRACTION         Family History:  His family history includes Diabetes type II in his son; Hypertension in his father and mother; Lymphoma in his brother; Prostate cancer in his father; Skin cancer in his father.    Social History:  He  reports that he has never smoked. He has never used smokeless tobacco. He reports current alcohol use of about 1.7 standard drinks of alcohol per week. He reports that he does not use drugs.    Medications:    Current Outpatient Medications:      aspirin 81 MG EC tablet, Take 81 mg by mouth daily., Disp: , Rfl:      atorvastatin (LIPITOR) 20 MG tablet, TAKE 1 TABLET BY MOUTH EVERY NIGHT AT BEDTIME, Disp: 90 tablet, Rfl: 1     losartan-hydrochlorothiazide (HYZAAR) 50-12.5 mg per tablet, Take 1 tablet by mouth daily., Disp: 90 tablet, Rfl: 3  HELD MEDICATIONS: None.    Allergies:  No latex allergies.  No Known Allergies    RISK BEHAVIORS AND HEALTH HABITS:  PSA: The natural history of prostate cancer and ongoing controversy regarding screening and potential treatment outcomes of prostate cancer has been discussed with the patient. The meaning of a false positive PSA and a false negative PSA has been discussed. He indicates understanding of the limitations of this screening test.  Seat Belt Use: YES  Calcium intake/Osteoporosis prevention: YES  Guns: NO  Sun Screen: YES  Dental Care: YES    REVIEW OF SYSTEMS:  Complete head to toe review of systems is otherwise negative except as above.    OBJECTIVE:  VITAL SIGNS:    Vitals:    12/01/20 1100   BP: 118/76   Pulse:     Resp:    Temp:    SpO2:      GENERAL:  Patient alert, in NAD  EYES: PERRLA. Extraoccular movements intact, pupils equal, reactive to light and accommodation.  Normal conjunctiva and lids.  Undilated fudoscopic exam normal, including normal size, appearance cup-to-disc ratio.  Normal posterior segments, including no exudates or hemorrhages.  ENT:  Hearing grossly normal.  Normal appearance to ears and nose.  Bilateral TM s, external canals, oropharynx normal. Normal lips, gums and teeth.  Normal nasal mucosa, septum and turbinates.  NECK:  Supple, without thyromegaly or mass.  RESP:  Clear to auscultation without crackles, wheezes or distress.  Normal respiratory effort.   BREASTS:  Nontender, without masses, nipple discharge, erythema, or axillary adenopathy.  CV:  Regular rate and rhythm without murmurs, rubs or gallops.  Normal carotid, abdominal aorta, femoral and pedal pulses.  No varicosities or edema.  ABDOMEN:  Soft, non-tender, without hepatosplenomegaly, masses, or hernias.  :   Deferred  LYMPHATIC: Normal palpation of neck, groin and axilla..  No lymphadenopathy.  No bruising.  NEURO:  CN II-XII intact, motor & sensory function all intact.  DTR and reflexes normal.  PSYCHIATRIC:  Alert & oriented with normal mood and affect.  Good judgment and insight.  SKIN:  Normal inspection and palpation.  Has a papular lesion in his umbilicus  MUSCULOSKELETAL: Normal gait and station.  - Spine / Ribs / Pelvis: Normal inspection, ROM, stability and strength: Spine, Head, Neck, Upper and Lower Extremities.    ASSESSMENT & PLAN  Kirby was seen today for annual exam.    Diagnoses and all orders for this visit:    Encounter for general adult medical examination with abnormal findings-    Fasting labs obtained today.  We discussed bringing in a copy of his advance care directive.  -     HM2(CBC w/o Differential)    Hypercholesterolemia-patient on statin.  Repeat lipid screening and decide if we need to adjust the  dose.  Continue on aspirin  -     Lipid [place] FASTING    Benign Essential Hypertension-blood pressure elevated in clinic today but is normal at home.  For now we will continue to monitor home readings and continue losartan 50 mg with hydrochlorothiazide 12.5 mg daily.  He will let us know when he needs refill.  We discussed follow-up every 6 months for evaluation and labs  -     Comprehensive Metabolic Panel    Impaired fasting glucose-counseled on weight loss  -     Glycosylated Hemoglobin A1c    Lipid screening  -     Lipid [place] FASTING    Encounter for hepatitis C screening test for low risk patient  -     Hepatitis C Antibody (Anti-HCV)    Family history of lymphoma  -     HM2(CBC w/o Differential)    Screening for prostate cancer  -     PSA, Annual Screen (Prostatic-Specific Antigen)    Screen for colon cancer-related to hemorrhoids. we discussed earlier screening given the blood in stool but likely that is related to hemorrhoids   He is not having any active symptoms.  Recommended fiber supplementation  -     Ambulatory referral for Colonoscopy    Other orders  -     Tdap vaccine,  8yo or older,  IM      General  Immunizations reviewed and updated .  Alcohol use, safety and moderation discussed.  Recommended adequate calcium intake/osteoporosis prevention.  Discussed colon cancer screening at age 50, 45 if -American.  Diet and exercise reviewed, including goal of aerobic exercise 30-90 minutes most days of the week, moderation of portions sizes, avoiding eating out and fast food and increase in fruits and vegetables.  Discussed safe sex practices.  Discussed & recommended seat belt (& motorcycle helmet) use.  Discussed & recommended smoke detector.  Discussed sun protection.  Discussed weight management.

## 2021-06-15 NOTE — TELEPHONE ENCOUNTER
Reason for Call:  Medication Refill    Do you use a [facility]?  Name of the pharmacy and phone number for the current request: [id number] [place], [place] - [address] [place] [address]  AT [facility]    Name of the medication requested: losartan-hydrochlorothiazide (HYZAAR) 50-12.5 mg per tablet    Other request: Patient has 3 days left. Patient reached out to pharmacy and they told him they've sent the refill request to [facility] last week and have not heard back. Patient is wondering if Dr. Alan or karen can send over a new Rx to the pharmacy.     Can we leave a detailed message on this number? Yes    Phone number patient can be reached at:   Cell number on file:    Telephone Information:   Mobile [phone]       Best Time: Anytime    Call taken on 2/22/2021 at 2:31 PM by Alo Duarte

## 2021-06-15 NOTE — TELEPHONE ENCOUNTER
Refill Approved    Rx renewed per Medication Renewal Policy. Medication was last renewed on 6/18/20, last OV 12/1/20.    Marsha Shaw, [facility] Triage/Med Refill 3/13/2021     Requested Prescriptions   Pending Prescriptions Disp Refills     atorvastatin (LIPITOR) 20 MG tablet [Pharmacy Med Name: ATORVASTATIN 20MG TABLETS] 90 tablet 1     Sig: TAKE 1 TABLET BY MOUTH EVERY NIGHT AT BEDTIME       Statins Refill Protocol (Hmg CoA Reductase Inhibitors) Passed - 3/13/2021  9:19 AM        Passed - PCP or prescribing provider visit in past 12 months      Last office visit with prescriber/PCP: Visit date not found OR same dept: Visit date not found OR same specialty: 6/18/2019 Ester Rich MD  Last physical: 12/1/2020 Last MTM visit: Visit date not found   Next visit within 3 mo: Visit date not found  Next physical within 3 mo: Visit date not found  Prescriber OR PCP: Carleen Alan DO  Last diagnosis associated with med order: 1. Mixed hyperlipidemia  - atorvastatin (LIPITOR) 20 MG tablet [Pharmacy Med Name: ATORVASTATIN 20MG TABLETS]; TAKE 1 TABLET BY MOUTH EVERY NIGHT AT BEDTIME  Dispense: 90 tablet; Refill: 1    If protocol passes may refill for 12 months if within 3 months of last provider visit (or a total of 15 months).

## 2021-06-16 PROBLEM — R73.01 IMPAIRED FASTING GLUCOSE: Chronic | Status: ACTIVE | Noted: 2018-12-03

## 2021-06-16 PROBLEM — E88.810 METABOLIC SYNDROME X: Chronic | Status: ACTIVE | Noted: 2019-12-18

## 2021-06-16 PROBLEM — K64.4 EXTERNAL HEMORRHOIDS: Status: ACTIVE | Noted: 2018-11-30

## 2021-06-17 NOTE — PATIENT INSTRUCTIONS - HE
Patient Instructions by Ester Rich MD at 6/3/2019  2:50 PM     Author: Ester Rich MD Service: -- Author Type: Physician    Filed: 6/3/2019  3:22 PM Encounter Date: 6/3/2019 Status: Addendum    : Ester Rich MD (Physician)    Related Notes: Original Note by Ester Rich MD (Physician) filed at 6/3/2019  3:20 PM       Patient Education     Chronic Cough with Uncertain Cause (Adult)    Everyone has had a cough as part of the common cold, flu, or bronchitis. This kind of cough occurs along with an achy feeling, low-grade fever, nasal and sinus congestion, and a scratchy or sore throat. This usually gets better in 2 to 3 weeks. A cough that lasts longer than 3 weeks may be due to other causes.  If your cough does not improve over the next 2 weeks, further testing may be needed. Follow up with your healthcare provider as advised. Cough suppressants may be recommended. Based on your exam today, the exact cause of your cough is not certain. Below are some common causes for persistent cough.  Smoker's cough  Smokers cough doesnt go away. If you continue to smoke, it only gets worse. The cough is from irritation in the air passages. Talk to your healthcare provider about quitting. Medicines or nicotine-replacement products, like gum or the patch, may make quitting easier.  Postnasal drip  A cough that is worse at night may be due to postnasal drip. Excess mucus in the nose drains from the back of your nose to your throat. This triggers the cough reflex. Postnasal drip may be due to a sinus infection or allergy. Common allergens include dust, tobacco smoke (both inhaled and secondhand smoke), environmental pollutants, pollen, mold, pets, cleaning agents, room deodorizers, and chemical fumes. Over-the-counter antihistamines or decongestants may be helpful for allergies. A sinus infection may requires antibiotic treatment. See your healthcare provider if symptoms continue.  Medicines  Certain  prescribed medicines can cause a chronic cough in some people:    ACE inhibitors for high blood pressure. These include benazepril, captopril, enalapril, fosinopril, lisinopril, quinapril, ramipril, and others.    Beta-blockers for high blood pressure and other conditions. These include propranolol, atenolol, metoprolol, nadolol, and others.  Let your healthcare provider know if you are taking any of these.  Asthma  Cough may be the only sign of mild asthma. You may have tests to find out if asthma is causing your cough. You may also take asthma medicine on a trial basis.  Acid reflux (heartburn, GERD)  The esophagus is the tube that carries food from the mouth to the stomach. A valve at its lower end prevents stomach acids from flowing upward. If this valve does not work properly, acid from the stomach enters the esophagus. This may cause a burning pain in the upper abdomen or lower chest, belching, or cough. Symptoms are often worse when lying flat. Avoid eating or drinking before bedtime. Try using extra pillows to raise your upper body, or place 4-inch blocks under the head of your bed. You may try an over-the-counter antacid or an acid-blocking medicine such as famotidine, cimetidine, ranitidine, esomeprazole, lansoprazole, or omeprazole. Stronger medicines for this condition can be prescribed by your healthcare provider.  Follow-up care  Follow up with your healthcare provider, or as advised, if your cough does not improve. Further testing may be needed.  Note: If an X-ray was taken, a specialist will review it. You will be notified of any new findings that may affect your care.  When to seek medical advice  Call your healthcare provider right away if any of these occur:    Mild wheezing or difficulty breathing    Fever of 100.4 F (38 C) or higher, or as directed by your healthcare provider    Unexpected weight loss    Coughing up large amounts of colored sputum    Night sweats (sheets and pajamas get soaking  wet)  Call 911  Call 911 if any of these occur:    Coughing up blood    Moderate to severe trouble breathing or wheezing  Date Last Reviewed: 9/13/2015 2000-2017 [employer]. [address] [place] [address], [place] [address]. All rights reserved. This information is not intended as a substitute for professional medical care. Always follow your healthcare professional's instructions.

## 2021-06-17 NOTE — TELEPHONE ENCOUNTER
Refill Approved    Rx renewed per Medication Renewal Policy. Medication was last renewed on 2/22/21.    Dileep Gallego, [facility] Triage/Med Refill 5/14/2021     Requested Prescriptions   Pending Prescriptions Disp Refills     losartan-hydrochlorothiazide (HYZAAR) 50-12.5 mg per tablet 90 tablet 0     Sig: Take 1 tablet by mouth daily.       Diuretics/Combination Diuretics Refill Protocol  Passed - 5/13/2021  2:40 PM        Passed - Visit with PCP or prescribing provider visit in past 12 months     Last office visit with prescriber/PCP: Visit date not found OR same dept: Visit date not found OR same specialty: 6/18/2019 Ester Rich MD  Last physical: 12/1/2020 Last MTM visit: Visit date not found   Next visit within 3 mo: Visit date not found  Next physical within 3 mo: Visit date not found  Prescriber OR PCP: Carleen Alan DO  Last diagnosis associated with med order: 1. Benign Essential Hypertension  - losartan-hydrochlorothiazide (HYZAAR) 50-12.5 mg per tablet; Take 1 tablet by mouth daily.  Dispense: 90 tablet; Refill: 0    If protocol passes may refill for 12 months if within 3 months of last provider visit (or a total of 15 months).             Passed - Serum Potassium in past 12 months      Lab Results   Component Value Date    Potassium 4.0 12/01/2020             Passed - Serum Sodium in past 12 months      Lab Results   Component Value Date    Sodium 141 12/01/2020             Passed - Blood pressure on file in past 12 months     BP Readings from Last 1 Encounters:   12/01/20 118/76             Passed - Serum Creatinine in past 12 months      Creatinine   Date Value Ref Range Status   12/01/2020 0.91 0.70 - 1.30 mg/dL Final

## 2021-06-17 NOTE — PATIENT INSTRUCTIONS - HE
Patient Instructions by Ester Rich MD at 6/18/2019 12:50 PM     Author: Ester Rich MD Service: -- Author Type: Physician    Filed: 6/18/2019  1:30 PM Encounter Date: 6/18/2019 Status: Signed    : Ester Rich MD (Physician)       Patient Education     Allergic Rhinitis  Allergic rhinitis is an allergic reaction that affects the nose, and often the eyes. Its often known as nasal allergies. Nasal allergies are often due to things in the environment that are breathed in. Depending what you are sensitive to, nasal allergies may occur only during certain seasons. Or they may occur year round. Common indoor allergens include house dust mites, mold, cockroaches, and pet dander. Outdoor allergens include pollen from trees, grasses, and weeds.   Symptoms include a drippy, stuffy, and itchy nose. They also include sneezing and red and itchy eyes. You may feel tired more often. Severe allergies may also affect your breathing and trigger a condition called asthma.   Tests can be done to see what allergens are affecting you. You may be referred to an allergy specialist for testing and further evaluation.  Home care  Your healthcare provider may prescribe medicines to help relieve allergy symptoms. These may include oral medicines, nasal sprays, or eye drops.  Ask your provider for advice on how to avoid substances that you are allergic to. Below are a few tips for each type of allergen.  Pet dander:    Do not have pets with fur and feathers.    If you can't avoid having a pet, keep it out of your bedroom and off upholstered furniture.  Pollen:    When pollen counts are high, keep windows of your car and home closed. If possible, use an air conditioner instead.    Wear a filter mask when mowing or doing yard work.  House dust mites:    Wash bedding every week in warm water and detergent and dry on a hot setting.    Cover the mattress, box spring, and pillows with allergy covers.     If possible, sleep  in a room with no carpet, curtains, or upholstered furniture.  Cockroaches:    Store food in sealed containers.    Remove garbage from the home promptly.    Fix water leaks  Mold:    Keep humidity low by using a dehumidifier or air conditioner. Keep the dehumidifier and air conditioner clean and free of mold.    Clean moldy areas with bleach and water.  In general:    Vacuum once or twice a week. If possible, use a vacuum with a high-efficiency particulate air (HEPA) filter.    Do not smoke. Avoid cigarette smoke. Cigarette smoke is an irritant that can make symptoms worse.  Follow-up care  Follow up as advised by the healthcare provider or our staff. If you were referred to an allergy specialist, make this appointment promptly.  When to seek medical advice  Call your healthcare provider right away if the following occur:    Coughing or wheezing    Fever of 100.4 F (38 C) or higher, or as directed by your healthcare provider    Raised red bumps (hives)    Continuing symptoms, new symptoms, or worsening symptoms  Call 911 if you have:    Trouble breathing    Severe swelling of the face or severe itching of the eyes or mouth  Date Last Reviewed: 3/1/2017    4575-4202 [employer]. [address] [place] [address], [place] [address]. All rights reserved. This information is not intended as a substitute for professional medical care. Always follow your healthcare professional's instructions.

## 2021-06-20 NOTE — LETTER
Letter by Mayela Goetz CNP at      Author: Mayela Goetz CNP Service: -- Author Type: --    Filed:  Encounter Date: 12/20/2019 Status: Signed         [facility] [id number]             December 20, 2019         Dear Mr. Mandujano,    Below are the results from your recent visit:    Resulted Orders   Lipid [place] FASTING   Result Value Ref Range    Cholesterol 179 <=199 mg/dL    Triglycerides 131 <=149 mg/dL    HDL Cholesterol 60 >=40 mg/dL    LDL Calculated 93 <=129 mg/dL    Patient Fasting > 8hrs? Yes    Comprehensive Metabolic Panel   Result Value Ref Range    Sodium 140 136 - 145 mmol/L    Potassium 3.8 3.5 - 5.0 mmol/L    Chloride 105 98 - 107 mmol/L    CO2 25 22 - 31 mmol/L    Anion Gap, Calculation 10 5 - 18 mmol/L    Glucose 111 70 - 125 mg/dL    BUN 14 8 - 22 mg/dL    Creatinine 0.90 0.70 - 1.30 mg/dL    GFR MDRD Af Amer >60 >60 mL/min/1.73m2    GFR MDRD Non Af Amer >60 >60 mL/min/1.73m2    Bilirubin, Total 1.7 (H) 0.0 - 1.0 mg/dL    Calcium 9.2 8.5 - 10.5 mg/dL    Protein, Total 7.0 6.0 - 8.0 g/dL    Albumin 4.1 3.5 - 5.0 g/dL    Alkaline Phosphatase 56 45 - 120 U/L    AST 23 0 - 40 U/L    ALT 31 0 - 45 U/L    Narrative    Fasting Glucose reference range is 70-99 mg/dL per  American Diabetes Association (ADA) guidelines.   PSA, Annual Screen (Prostatic-Specific Antigen)   Result Value Ref Range    PSA 0.5 0.0 - 3.5 ng/mL    Narrative    Method is Abbott Prostate-Specific Antigen (PSA)  Standard-WHO 1st International (90:10)   Glycosylated Hemoglobin A1C   Result Value Ref Range    Hemoglobin A1c 5.9 3.5 - 6.0 %     Prostate, cholesterol, electrolytes, kidneys, and liver are normal. Fasting glucose and hemoglobin A1c have worsened indicating you are still in the prediabetic stage however; now is the time to consider a Low Carbohydrate diet high fat diet consider aiming for 50-70 net grams of carbs per day (net carbs are grams of carbs minus grams of fiber).  The prediabetes indicates that you have insulin resistance and high blood insulin levels please avoid foods that increase insulin levels like refined carbohydrates (bread, pasta, cereals, rice, foods with added sugars like baked goods, and sugar sweetened beverages). Recheck in 6 months.    Please call with questions or contact us using [employer].    Sincerely,        Electronically signed by Mayela Goetz CNP

## 2021-06-22 NOTE — PROGRESS NOTES
MALE ADULT PREVENTIVE EXAM    CHIEF COMPLAINT:  Male preventive exam.    HISTORY OF PRESENT ILLNESS:  Kirby Mandujano is a 58 y.o. male.  He last saw me Visit date not found.  2 cans soda -diet mtn dew   -Blood pressure is mildly elevated today.  Has been having some stressors at home including his wife who has been on disability for her back.  Work is somewhat stressful but no different than usual.  Has had history of hypokalemia with his chlorthalidone.  Is slacking on the diet and exercise  -He has been having concerns where twice he has noticed blood on the toilet paper.  Not necessarily a bulge in the rectum.  Nothing in the toilet are mixed in the stool.  His last colonoscopy was less than 10 years ago and was normal with no polyps.  Denies any weight loss any change in caliber of stools.  Does not think he has issues with constipation.    He  has a past medical history of Hernia, ventral (10/19/2017).    Lab Results   Component Value Date    WBC 5.4 11/30/2018    HGB 16.4 11/30/2018    HCT 47.2 11/30/2018    MCV 91 11/30/2018     11/30/2018     11/30/2018    K 3.6 11/30/2018    BUN 14 11/30/2018     Lab Results   Component Value Date    CHOL 185 11/30/2018    HDL 60 11/30/2018    LDLCALC 97 11/30/2018    TRIG 141 11/30/2018     Lab Results   Component Value Date    PSA 0.4 11/30/2018     No results found for: TSH  BP Readings from Last 3 Encounters:   11/30/18 (!) 138/93   10/19/17 (!) 143/95   10/10/17 144/88       Surgeries:    Past Surgical History:   Procedure Laterality Date     OR REMOVAL OF SPERM DUCT(S)      Description: Surgery Of Male Genitalia Vasectomy;  Recorded: 11/17/2008;     WISDOM TOOTH EXTRACTION         Family History:  His family history includes Diabetes type II in his son; Hypertension in his father and mother; Prostate cancer in his father.    Social History:  He  reports that  has never smoked. he has never used smokeless tobacco. He reports that he drinks about 1.0 oz  of alcohol per week. He reports that he does not use drugs.    Medications:    Current Outpatient Medications:      aspirin 81 MG EC tablet, Take 81 mg by mouth daily., Disp: , Rfl:      atorvastatin (LIPITOR) 20 MG tablet, TAKE 1 TABLET(20 MG) BY MOUTH AT BEDTIME., Disp: 90 tablet, Rfl: 4     lisinopril-hydrochlorothiazide (PRINZIDE,ZESTORETIC) 10-12.5 mg per tablet, Take 1 tablet by mouth daily., Disp: 30 tablet, Rfl: 3  HELD MEDICATIONS: None.    Allergies:  No latex allergies.  No Known Allergies    RISK BEHAVIORS AND HEALTH HABITS:  PSA: The natural history of prostate cancer and ongoing controversy regarding screening and potential treatment outcomes of prostate cancer has been discussed with the patient. The meaning of a false positive PSA and a false negative PSA has been discussed. He indicates understanding of the limitations of this screening test.  Seat Belt Use: YES  Calcium intake/Osteoporosis prevention: YES  Guns: NO  Sun Screen: YES  Dental Care: YES    REVIEW OF SYSTEMS:  Complete head to toe review of systems is otherwise negative except as above.    OBJECTIVE:  VITAL SIGNS:    Vitals:    11/30/18 1124   BP: (!) 138/93   Pulse: (!) 56   Resp:    Temp:      GENERAL:  Patient alert, in NAD  EYES: PERRLA. Extraoccular movements intact, pupils equal, reactive to light and accommodation.  Normal conjunctiva and lids.  Undilated fudoscopic exam normal, including normal size, appearance cup-to-disc ratio.  Normal posterior segments, including no exudates or hemorrhages.  ENT:  Hearing grossly normal.  Normal appearance to ears and nose.  Bilateral TM s, external canals, oropharynx normal. Normal lips, gums and teeth.  Normal nasal mucosa, septum and turbinates.  NECK:  Supple, without thyromegaly or mass.  RESP:  Clear to auscultation without crackles, wheezes or distress.  Normal respiratory effort.   BREASTS:  Nontender, without masses, nipple discharge, erythema, or axillary adenopathy.  CV:  Regular  rate and rhythm without murmurs, rubs or gallops.  Normal carotid, abdominal aorta, femoral and pedal pulses.  No varicosities or edema.  ABDOMEN:  Soft, non-tender, without hepatosplenomegaly, masses, or hernias.  :  Normal scrotum.  Penis circumcised without lesions or discharge.  Rectal exam with bulging external hemorrhoid skin tags  LYMPHATIC: Normal palpation of neck, groin and axilla..  No lymphadenopathy.  No bruising.  NEURO:  CN II-XII intact, motor & sensory function all intact.  DTR and reflexes normal.  PSYCHIATRIC:  Alert & oriented with normal mood and affect.  Good judgment and insight.  SKIN:  Normal inspection and palpation.  MUSCULOSKELETAL: Normal gait and station.  - Spine / Ribs / Pelvis: Normal inspection, ROM, stability and strength: Spine, Head, Neck, Upper and Lower Extremities.    ASSESSMENT & PLAN  Kirby was seen today for annual exam.    Diagnoses and all orders for this visit:    Encounter for routine history and physical exam for male  -     HM2(CBC w/o Differential)    Benign Essential Hypertension  -     Comprehensive Metabolic Panel    Hypercholesterolemia  -     Lipid [place] FASTING    Screening for malignant neoplasm of prostate  -     PSA, Annual Screen (Prostatic-Specific Antigen)    Screening for colorectal cancer  -     Cancel: Ambulatory referral for Colonoscopy    External hemorrhoids    Impaired fasting glucose  -     Glycosylated Hemoglobin A1c    Mixed hyperlipidemia  -     atorvastatin (LIPITOR) 20 MG tablet; TAKE 1 TABLET(20 MG) BY MOUTH AT BEDTIME.    Other orders  -     lisinopril-hydrochlorothiazide (PRINZIDE,ZESTORETIC) 10-12.5 mg per tablet; Take 1 tablet by mouth daily.    Patient was seen today for male physical.  Blood pressure is elevated we discussed how this could be secondary to weight gain as well as poor eating habits and really emphasized dietary change and exercising 150 minutes/week.  We will refill his atorvastatin if cholesterol is within goal  and I showed him his risk stratification calculator with how his blood pressure is now.  We decided to change his chlorthalidone to lisinopril with hydrochlorothiazide.  He will return in a couple weeks to have blood pressure rechecked with labs  -We also discussed his rectal bleeding which on exam appears to be more related to external hemorrhoids.  He does not think that he is straining often but we discussed maintaining high-fiber diet and increased fluids to see if this will help.  If he has ongoing issues with hematochezia where he is actually seeing in his stools he is to let us know and we will consider doing colonoscopy earlier.    Follow-up in 6 months for blood pressure check      Patient instructions:   Lets change your blood pressure medication to  Lisinopril -hctz - 1 tablet by mouth every morning. Goal blood pressure < 130/ 80s   Schedule nurse and lab appointment for blood pressure check and lab  -if blood pressure in good range we can change in to 90 day supply if you wish.     -stop chlorthalidone    - Watch for hard BM, or painful BM, then more likely hemorrhoids and ok to hold on colonoscopy.     -20-30 g fiber a day recommended for soft BM - for some people consider metamucil and increased fluids     General  Immunizations reviewed and updated .  Alcohol use, safety and moderation discussed.  Recommended adequate calcium intake/osteoporosis prevention.  Discussed colon cancer screening at age 50, 45 if -American.  Diet and exercise reviewed, including goal of aerobic exercise 30-90 minutes most days of the week, moderation of portions sizes, avoiding eating out and fast food and increase in fruits and vegetables.  Discussed safe sex practices.  Discussed & recommended seat belt (& motorcycle helmet) use.  Discussed & recommended smoke detector.  Discussed sun protection.  Discussed weight management.

## 2021-06-23 ENCOUNTER — RECORDS - HEALTHEAST (OUTPATIENT)
Dept: ADMINISTRATIVE | Facility: OTHER | Age: 61
End: 2021-06-23

## 2021-06-29 ENCOUNTER — TELEPHONE (OUTPATIENT)
Dept: FAMILY MEDICINE | Facility: CLINIC | Age: 61
End: 2021-06-29

## 2021-06-29 ENCOUNTER — RECORDS - HEALTHEAST (OUTPATIENT)
Dept: FAMILY MEDICINE | Facility: CLINIC | Age: 61
End: 2021-06-29

## 2021-06-29 DIAGNOSIS — E78.00 PURE HYPERCHOLESTEROLEMIA: ICD-10-CM

## 2021-06-29 DIAGNOSIS — R73.01 IMPAIRED FASTING GLUCOSE: ICD-10-CM

## 2021-06-29 DIAGNOSIS — I10 ESSENTIAL HYPERTENSION, BENIGN: ICD-10-CM

## 2021-07-04 NOTE — TELEPHONE ENCOUNTER
Telephone Encounter by Rowan Butler MA at 6/30/2021 10:22 AM     Author: Rowan Butler MA Service: -- Author Type: Certified Medical Assistant    Filed: 6/30/2021 10:22 AM Encounter Date: 6/29/2021 Status: Signed    : Rowan Butler MA (Certified Medical Assistant)       Left message for pt relaying MD message and asked him to call back if he had concerns that he wanted fasting labs checked

## 2021-07-04 NOTE — TELEPHONE ENCOUNTER
Telephone Encounter by Carleen Alan DO at 6/30/2021  6:07 AM     Author: Carleen Alan DO Service: -- Author Type: Physician    Filed: 6/30/2021  6:08 AM Encounter Date: 6/29/2021 Status: Signed    : Carleen Alan DO (Physician)       Let pt know he is welcome to schedule a lab appt earlier that morning and I will pend orders but he had labs drawn 6 months ago for cholesterol and if he is not concerned about rechecking that, nor am I and he can get NONFASTING labs instead at the time of his visit. Up to him     (labs pended in case)

## 2021-07-04 NOTE — TELEPHONE ENCOUNTER
Who is calling:  The patient  Reason for Call:  The patient is wondering if he can come the morning of his appointment and have a blood draw?   can not schedule.  Date of last appointment with primary care: 12-01-20  Okay to leave a detailed message: Yes

## 2021-07-30 ENCOUNTER — OFFICE VISIT (OUTPATIENT)
Dept: FAMILY MEDICINE | Facility: CLINIC | Age: 61
End: 2021-07-30
Payer: COMMERCIAL

## 2021-07-30 VITALS
DIASTOLIC BLOOD PRESSURE: 83 MMHG | TEMPERATURE: 98.9 F | WEIGHT: 195.6 LBS | RESPIRATION RATE: 18 BRPM | OXYGEN SATURATION: 96 % | HEART RATE: 63 BPM | BODY MASS INDEX: 28.89 KG/M2 | SYSTOLIC BLOOD PRESSURE: 132 MMHG

## 2021-07-30 DIAGNOSIS — E78.2 MIXED HYPERLIPIDEMIA: ICD-10-CM

## 2021-07-30 DIAGNOSIS — E78.00 PURE HYPERCHOLESTEROLEMIA: Primary | Chronic | ICD-10-CM

## 2021-07-30 DIAGNOSIS — I10 ESSENTIAL HYPERTENSION, BENIGN: ICD-10-CM

## 2021-07-30 LAB
ALBUMIN SERPL-MCNC: 4 G/DL (ref 3.5–5)
ALP SERPL-CCNC: 54 U/L (ref 45–120)
ALT SERPL W P-5'-P-CCNC: 27 U/L (ref 0–45)
ANION GAP SERPL CALCULATED.3IONS-SCNC: 12 MMOL/L (ref 5–18)
AST SERPL W P-5'-P-CCNC: 24 U/L (ref 0–40)
BILIRUB SERPL-MCNC: 1.2 MG/DL (ref 0–1)
BUN SERPL-MCNC: 14 MG/DL (ref 8–22)
CALCIUM SERPL-MCNC: 9.7 MG/DL (ref 8.5–10.5)
CHLORIDE BLD-SCNC: 104 MMOL/L (ref 98–107)
CO2 SERPL-SCNC: 25 MMOL/L (ref 22–31)
CREAT SERPL-MCNC: 0.81 MG/DL (ref 0.7–1.3)
GFR SERPL CREATININE-BSD FRML MDRD: >90 ML/MIN/1.73M2
GLUCOSE BLD-MCNC: 125 MG/DL (ref 70–125)
POTASSIUM BLD-SCNC: 3.6 MMOL/L (ref 3.5–5)
PROT SERPL-MCNC: 7.3 G/DL (ref 6–8)
SODIUM SERPL-SCNC: 141 MMOL/L (ref 136–145)

## 2021-07-30 PROCEDURE — 99213 OFFICE O/P EST LOW 20 MIN: CPT | Performed by: FAMILY MEDICINE

## 2021-07-30 PROCEDURE — 36415 COLL VENOUS BLD VENIPUNCTURE: CPT | Performed by: FAMILY MEDICINE

## 2021-07-30 PROCEDURE — 80053 COMPREHEN METABOLIC PANEL: CPT | Performed by: FAMILY MEDICINE

## 2021-07-30 RX ORDER — ATORVASTATIN CALCIUM 20 MG/1
TABLET, FILM COATED ORAL
Qty: 90 TABLET | Refills: 4 | Status: SHIPPED | OUTPATIENT
Start: 2021-07-30 | End: 2022-08-27

## 2021-07-30 RX ORDER — LOSARTAN POTASSIUM AND HYDROCHLOROTHIAZIDE 12.5; 5 MG/1; MG/1
1 TABLET ORAL DAILY
Qty: 90 TABLET | Refills: 2 | Status: SHIPPED | OUTPATIENT
Start: 2021-07-30 | End: 2022-03-11

## 2021-07-30 NOTE — PROGRESS NOTES
"    Assessment & Plan     Essential hypertension, benign  -Blood pressure stable well-controlled at this time based on readings that he brought in ambulatory from home.  Continue losartan with hydrochlorothiazide.  If seeing numbers trending upward we would double the dose.  - losartan-hydrochlorothiazide (HYZAAR) 50-12.5 MG tablet  Dispense: 90 tablet; Refill: 2  - Comprehensive metabolic panel    Mixed hyperlipidemia  Continue atorvastatin 20 mg daily given higher risk factors for coronary artery disease.  Continue aspirin therapy.  We discussed follow-up in approximately 6 months for annual physical  - atorvastatin (LIPITOR) 20 MG tablet  Dispense: 90 tablet; Refill: 4      The 10-year ASCVD risk score (Luiskristi ALLEN JrDuane, et al., 2013) is: 7.8%    Values used to calculate the score:      Age: 61 years      Sex: Male      Is Non- : No      Diabetic: No      Tobacco smoker: No      Systolic Blood Pressure: 132 mmHg      Is BP treated: Yes      HDL Cholesterol: 64 mg/dL      Total Cholesterol: 173 mg/dL    Ordering of each unique test  Prescription drug management  23 minutes spent on the date of the encounter doing chart review, history and exam, documentation and further activities per the note        BMI:   Estimated body mass index is 28.89 kg/m  as calculated from the following:    Height as of 12/1/20: 1.753 m (5' 9\").    Weight as of this encounter: 88.7 kg (195 lb 9.6 oz).   Weight management plan: Discussed healthy diet and exercise guidelines    Work on weight loss  Regular exercise    No follow-ups on file.    Carleen Alan MD  [facility]    Damon Ramos is a 61 year old who presents for the following health issues -follow-up hypertension hypercholesterolemia    HPI   No muscle cramps on the atorvastatin.  Tolerating ok  A lot of yard work. No ischemic symptoms.   Up in chest wall if lays in super position would have some popping up of chest wall " . Every so often would  Feel for up to a week. Feels on outside and usually can be if moves a certain way and moves back and seems to go back to place. No pressure. Not hypermobile.  No connective tissue diseases or concerns in the family     Blood pressures in good range  Ranging 120s- 130s and occasional 150 and 140  Repeat blood pressure  132/83-   Omron wrist cuff from home shows  134/82   compared [id number] was very accurate    Wt Readings from Last 3 Encounters:   07/30/21 88.7 kg (195 lb 9.6 oz)   12/01/20 89.5 kg (197 lb 6.4 oz)   12/18/19 89.9 kg (198 lb 3.2 oz)     Low back pain from weeding an hour. -whirlpool .        Review of Systems      Complete ROS reviewed in HPI or otherwise negative        Objective    /83   Pulse 63   Temp 98.9  F (37.2  C) (Oral)   Resp 18   Wt 88.7 kg (195 lb 9.6 oz)   SpO2 96%   BMI 28.89 kg/m    Body mass index is 28.89 kg/m .  Physical Exam   GENERAL: healthy, alert and no distress  EYES: Eyes grossly normal to inspection, PERRL and conjunctivae and sclerae normal  NECK: no adenopathy, no asymmetry, masses, or scars and thyroid normal to palpation  RESP: lungs clear to auscultation - no rales, rhonchi or wheezes  CV: regular rate and rhythm, normal S1 S2, no S3 or S4, no murmur, click or rub, no peripheral edema and peripheral pulses strong  MS: no gross musculoskeletal defects noted, no edema  SKIN: no suspicious lesions or rashes  NEURO: Normal strength and tone, mentation intact and speech normal  PSYCH: mentation appears normal, affect normal/bright    Results for orders placed or performed in visit on 07/30/21   Comprehensive metabolic panel     Status: Abnormal   Result Value Ref Range    Sodium 141 136 - 145 mmol/L    Potassium 3.6 3.5 - 5.0 mmol/L    Chloride 104 98 - 107 mmol/L    Carbon Dioxide (CO2) 25 22 - 31 mmol/L    Anion Gap 12 5 - 18 mmol/L    Urea Nitrogen 14 8 - 22 mg/dL    Creatinine 0.81 0.70 - 1.30 mg/dL    Calcium 9.7 8.5 - 10.5 mg/dL     Glucose 125 70 - 125 mg/dL    Alkaline Phosphatase 54 45 - 120 U/L    AST 24 0 - 40 U/L    ALT 27 0 - 45 U/L    Protein Total 7.3 6.0 - 8.0 g/dL    Albumin 4.0 3.5 - 5.0 g/dL    Bilirubin Total 1.2 (H) 0.0 - 1.0 mg/dL    GFR Estimate >90 >60 mL/min/1.73m2

## 2021-10-11 ENCOUNTER — HEALTH MAINTENANCE LETTER (OUTPATIENT)
Age: 61
End: 2021-10-11

## 2021-12-20 ENCOUNTER — IMMUNIZATION (OUTPATIENT)
Dept: NURSING | Facility: CLINIC | Age: 61
End: 2021-12-20
Payer: COMMERCIAL

## 2021-12-20 PROCEDURE — 91300 PR COVID VAC PFIZER DIL RECON 30 MCG/0.3 ML IM: CPT

## 2021-12-20 PROCEDURE — 0004A PR COVID VAC PFIZER DIL RECON 30 MCG/0.3 ML IM: CPT

## 2022-01-30 ENCOUNTER — HEALTH MAINTENANCE LETTER (OUTPATIENT)
Age: 62
End: 2022-01-30

## 2022-02-08 DIAGNOSIS — I10 ESSENTIAL HYPERTENSION, BENIGN: ICD-10-CM

## 2022-02-09 RX ORDER — LOSARTAN POTASSIUM AND HYDROCHLOROTHIAZIDE 12.5; 5 MG/1; MG/1
1 TABLET ORAL DAILY
Qty: 90 TABLET | Refills: 2 | OUTPATIENT
Start: 2022-02-09

## 2022-02-09 NOTE — TELEPHONE ENCOUNTER
Denied Rx, patient should already have refills on file at the same pharmacy requested.     losartan-hydrochlorothiazide (HYZAAR) 50-12.5 MG tablet 90 tablet 2 7/30/2021  No   Sig - Route: Take 1 tablet by mouth daily - Oral   Sent to pharmacy as: Losartan Potassium-HCTZ 50-12.5 MG Oral Tablet (HYZAAR)   Class: E-Prescribe   Order: [id number]   E-Prescribing Status: Receipt confirmed by pharmacy (7/30/2021  2:31 PM CDT)         Marissa Raza RN, BSN Nurse Triage Advisor 2:24 PM 2/9/2022

## 2022-03-11 ENCOUNTER — OFFICE VISIT (OUTPATIENT)
Dept: FAMILY MEDICINE | Facility: CLINIC | Age: 62
End: 2022-03-11
Payer: COMMERCIAL

## 2022-03-11 VITALS
HEIGHT: 69 IN | DIASTOLIC BLOOD PRESSURE: 77 MMHG | BODY MASS INDEX: 29.53 KG/M2 | RESPIRATION RATE: 16 BRPM | OXYGEN SATURATION: 97 % | HEART RATE: 49 BPM | TEMPERATURE: 99 F | SYSTOLIC BLOOD PRESSURE: 131 MMHG | WEIGHT: 199.4 LBS

## 2022-03-11 DIAGNOSIS — K64.4 EXTERNAL HEMORRHOIDS: ICD-10-CM

## 2022-03-11 DIAGNOSIS — Z12.5 SCREENING FOR PROSTATE CANCER: ICD-10-CM

## 2022-03-11 DIAGNOSIS — Z00.01 ENCOUNTER FOR GENERAL ADULT MEDICAL EXAMINATION WITH ABNORMAL FINDINGS: Primary | ICD-10-CM

## 2022-03-11 DIAGNOSIS — E78.00 PURE HYPERCHOLESTEROLEMIA: Chronic | ICD-10-CM

## 2022-03-11 DIAGNOSIS — R00.1 BRADYCARDIA: ICD-10-CM

## 2022-03-11 DIAGNOSIS — R73.03 PREDIABETES: ICD-10-CM

## 2022-03-11 DIAGNOSIS — Z13.220 LIPID SCREENING: ICD-10-CM

## 2022-03-11 DIAGNOSIS — I10 ESSENTIAL HYPERTENSION, BENIGN: ICD-10-CM

## 2022-03-11 DIAGNOSIS — L82.0 INFLAMED SEBORRHEIC KERATOSIS: ICD-10-CM

## 2022-03-11 LAB
ALBUMIN SERPL-MCNC: 4.1 G/DL (ref 3.5–5)
ALP SERPL-CCNC: 50 U/L (ref 45–120)
ALT SERPL W P-5'-P-CCNC: 27 U/L (ref 0–45)
ANION GAP SERPL CALCULATED.3IONS-SCNC: 13 MMOL/L (ref 5–18)
AST SERPL W P-5'-P-CCNC: 20 U/L (ref 0–40)
BILIRUB SERPL-MCNC: 1.4 MG/DL (ref 0–1)
BUN SERPL-MCNC: 13 MG/DL (ref 8–22)
CALCIUM SERPL-MCNC: 9.4 MG/DL (ref 8.5–10.5)
CHLORIDE BLD-SCNC: 104 MMOL/L (ref 98–107)
CHOLEST SERPL-MCNC: 179 MG/DL
CO2 SERPL-SCNC: 24 MMOL/L (ref 22–31)
CREAT SERPL-MCNC: 0.85 MG/DL (ref 0.7–1.3)
ERYTHROCYTE [DISTWIDTH] IN BLOOD BY AUTOMATED COUNT: 12.6 % (ref 10–15)
FASTING STATUS PATIENT QL REPORTED: YES
GFR SERPL CREATININE-BSD FRML MDRD: >90 ML/MIN/1.73M2
GLUCOSE BLD-MCNC: 107 MG/DL (ref 70–125)
HCT VFR BLD AUTO: 44.2 % (ref 40–53)
HDLC SERPL-MCNC: 59 MG/DL
HGB BLD-MCNC: 15.4 G/DL (ref 13.3–17.7)
LDLC SERPL CALC-MCNC: 95 MG/DL
MCH RBC QN AUTO: 31.4 PG (ref 26.5–33)
MCHC RBC AUTO-ENTMCNC: 34.8 G/DL (ref 31.5–36.5)
MCV RBC AUTO: 90 FL (ref 78–100)
PLATELET # BLD AUTO: 238 10E3/UL (ref 150–450)
POTASSIUM BLD-SCNC: 3.8 MMOL/L (ref 3.5–5)
PROT SERPL-MCNC: 7.1 G/DL (ref 6–8)
PSA SERPL-MCNC: 0.49 UG/L (ref 0–4.5)
RBC # BLD AUTO: 4.91 10E6/UL (ref 4.4–5.9)
SODIUM SERPL-SCNC: 141 MMOL/L (ref 136–145)
TRIGL SERPL-MCNC: 126 MG/DL
WBC # BLD AUTO: 5.1 10E3/UL (ref 4–11)

## 2022-03-11 PROCEDURE — 80053 COMPREHEN METABOLIC PANEL: CPT | Performed by: FAMILY MEDICINE

## 2022-03-11 PROCEDURE — 99214 OFFICE O/P EST MOD 30 MIN: CPT | Mod: 25 | Performed by: FAMILY MEDICINE

## 2022-03-11 PROCEDURE — 80061 LIPID PANEL: CPT | Performed by: FAMILY MEDICINE

## 2022-03-11 PROCEDURE — 36415 COLL VENOUS BLD VENIPUNCTURE: CPT | Performed by: FAMILY MEDICINE

## 2022-03-11 PROCEDURE — 85027 COMPLETE CBC AUTOMATED: CPT | Performed by: FAMILY MEDICINE

## 2022-03-11 PROCEDURE — 99396 PREV VISIT EST AGE 40-64: CPT | Performed by: FAMILY MEDICINE

## 2022-03-11 PROCEDURE — 83036 HEMOGLOBIN GLYCOSYLATED A1C: CPT | Performed by: FAMILY MEDICINE

## 2022-03-11 PROCEDURE — G0103 PSA SCREENING: HCPCS | Performed by: FAMILY MEDICINE

## 2022-03-11 RX ORDER — LOSARTAN POTASSIUM AND HYDROCHLOROTHIAZIDE 12.5; 5 MG/1; MG/1
1 TABLET ORAL 2 TIMES DAILY
Qty: 180 TABLET | Refills: 2 | Status: SHIPPED | OUTPATIENT
Start: 2022-03-11 | End: 2022-11-25

## 2022-03-11 NOTE — PROGRESS NOTES
SUBJECTIVE:   CC: Kirby Mandujano is an 61 year old male who presents for preventative health visit.        Patient has been advised of split billing requirements and indicates understanding: Yes  Healthy Habits:     Getting at least 3 servings of Calcium per day:  Yes    Bi-annual eye exam:  Yes    Dental care twice a year:  Yes    Sleep apnea or symptoms of sleep apnea:  None    Diet:  Regular (no restrictions)    Frequency of exercise:  None    Taking medications regularly:  Yes    Medication side effects:  None and No significant flushing    PHQ-2 Total Score: 0    Additional concerns today:  Yes  Patient is being seen today for annual physical.  No acute concerns.  A growth along collar neckline. Bothersome. Itchy.   Not doing any cardiovascular exercise. Longer hours at work. Wants to retire. Longest will work one more year.   Saw eye doctor -new glasses. No concern with the eye.   No cardiac issues.   Hemorrhoids flare time to time. No other GI concerns   Blood in the stool bright red blood. More so, then will subside and come and go. 3 weeks before subsided . Colonoscopy last year normal  - repeat in 3 years.   3 bowls of popcorn at a time   Takes bp med between 8-9 am- 10-12 to the bathroom . OJ then diet pop one in morning and one at lunch. He broughti n BP readins which are 130s/80 s mostly. Reads 10 pt higher home than here. Occasionally in the 140s-150s more so at night but without pattern .  Dad had hx low pulse rate. And sleep apnea. No fatigue for Richard        Wt Readings from Last 3 Encounters:   03/11/22 90.4 kg (199 lb 6.4 oz)   07/30/21 88.7 kg (195 lb 9.6 oz)   12/01/20 89.5 kg (197 lb 6.4 oz)     Recheck pulse is  58.        Today's PHQ-2 Score:   PHQ-2 ( 1999 Pfizer) 3/11/2022   Q1: Little interest or pleasure in doing things 0   Q2: Feeling down, depressed or hopeless 0   PHQ-2 Score 0   PHQ-2 Total Score (12-17 Years)- Positive if 3 or more points; Administer PHQ-A if positive -   Q1: Little  interest or pleasure in doing things Not at all   Q2: Feeling down, depressed or hopeless Not at all   PHQ-2 Score 0       Abuse: Current or Past(Physical, Sexual or Emotional)- No  Do you feel safe in your environment? Yes    Have you ever done Advance Care Planning? (For example, a Health Directive, POLST, or a discussion with a medical provider or your loved ones about your wishes): Yes, advance care planning is on file.    Social History     Tobacco Use     Smoking status: Never Smoker     Smokeless tobacco: Never Used   Substance Use Topics     Alcohol use: Yes     Alcohol/week: 1.7 standard drinks         Alcohol Use 3/11/2022   Prescreen: >3 drinks/day or >7 drinks/week? No       Last PSA:   Prostate Specific Antigen Screen   Date Value Ref Range Status   12/01/2020 0.5 0.0 - 3.5 ng/mL Final       Reviewed orders with patient. Reviewed health maintenance and updated orders accordingly - Yes  BP Readings from Last 3 Encounters:   03/11/22 131/77   07/30/21 132/83   12/01/20 118/76    Wt Readings from Last 3 Encounters:   03/11/22 90.4 kg (199 lb 6.4 oz)   07/30/21 88.7 kg (195 lb 9.6 oz)   12/01/20 89.5 kg (197 lb 6.4 oz)                  Patient Active Problem List   Diagnosis     Hypercholesterolemia     Essential hypertension, benign     Overweight     External hemorrhoids     Impaired fasting glucose     Metabolic syndrome X     Encounter for general adult medical examination with abnormal findings     Past Surgical History:   Procedure Laterality Date     REMOVAL OF SPERM DUCT(S)      Description: Surgery Of Male Genitalia Vasectomy;  Recorded: 11/17/2008;     WISDOM TOOTH EXTRACTION         Social History     Tobacco Use     Smoking status: Never Smoker     Smokeless tobacco: Never Used   Substance Use Topics     Alcohol use: Yes     Alcohol/week: 1.7 standard drinks     Family History   Problem Relation Age of Onset     Hypertension Mother      Prostate Cancer Father      Hypertension Father      Skin  "Cancer Father      Diabetes Type 2  Son      Lymphoma Brother         non hodgkins          Current Outpatient Medications   Medication Sig Dispense Refill     aspirin 81 MG EC tablet [ASPIRIN 81 MG EC TABLET] Take 81 mg by mouth daily.       atorvastatin (LIPITOR) 20 MG tablet [ATORVASTATIN (LIPITOR) 20 MG TABLET] TAKE 1 TABLET BY MOUTH EVERY NIGHT AT BEDTIME 90 tablet 4     losartan-hydrochlorothiazide (HYZAAR) 50-12.5 MG tablet Take 1 tablet by mouth 2 times daily 180 tablet 2     No Known Allergies    Reviewed and updated as needed this visit by clinical staff   Tobacco  Allergies  Meds  Problems  Med Hx  Surg Hx  Fam Hx          Reviewed and updated as needed this visit by Provider   Tobacco  Allergies  Meds  Problems  Med Hx  Surg Hx  Fam Hx         Past Medical History:   Diagnosis Date     Hernia, ventral 10/19/2017      Past Surgical History:   Procedure Laterality Date     REMOVAL OF SPERM DUCT(S)      Description: Surgery Of Male Genitalia Vasectomy;  Recorded: 11/17/2008;     WISDOM TOOTH EXTRACTION         Review of Systems     Complete ROS reviewed in HPI or otherwise negative      OBJECTIVE:   /77   Pulse (!) 49   Temp 99  F (37.2  C) (Oral)   Resp 16   Ht 1.746 m (5' 8.75\")   Wt 90.4 kg (199 lb 6.4 oz)   SpO2 97%   BMI 29.66 kg/m      Physical Exam  GENERAL: healthy, alert and no distress  EYES: Eyes grossly normal to inspection, PERRL and conjunctivae and sclerae normal  HENT: ear canals and TM's normal, nose and mouth without ulcers or lesions  NECK: no adenopathy, no asymmetry, masses, or scars and thyroid normal to palpation  RESP: lungs clear to auscultation - no rales, rhonchi or wheezes  BREAST: normal without masses, tenderness or nipple discharge and no palpable axillary masses or adenopathy  CV: regular rate and rhythm, normal S1 S2, no S3 or S4, no murmur, click or rub, no peripheral edema and peripheral pulses strong  ABDOMEN: soft, nontender, no " hepatosplenomegaly, no masses and bowel sounds normal  MS: no gross musculoskeletal defects noted, no edema  SKIN: no suspicious lesions or rashes.  Tan seborrheic keratosis left neck base not inflamed  NEURO: Normal strength and tone, mentation intact and speech normal  PSYCH: mentation appears normal, affect normal/bright  LYMPH: no cervical, supraclavicular, axillary, or inguinal adenopathy       Diagnostic Test Results:  Labs reviewed in Epic  No results found for this or any previous visit (from the past 24 hour(s)).    ASSESSMENT/PLAN:     Problem List Items Addressed This Visit     Hypercholesterolemia (Chronic)     Continue with atorvastatin 20 mg every evening.  Tolerating well without myalgias.  Lipid panel and LFTs checked today.  Refill as needed and follow-up in a year         Essential hypertension, benign     Blood pressure is still mildly elevated at times.  We discussed doubling his dose of losartan with hydrochlorothiazide and for more even control we are going to have him take 1 tablet twice daily.  If too much urination at bedtime he can take the double dose in the morning instead.  We will have him come back in 6 months for follow-up for blood pressure but sooner if he is having any dizziness or lightheadedness or any other abnormal symptoms he will let me know so we can repeat labs sooner.  Encouraged hydration and cardiovascular exercise.  Pulse rate in the 40s to 50s but patient is asymptomatic.         Relevant Medications    losartan-hydrochlorothiazide (HYZAAR) 50-12.5 MG tablet    External hemorrhoids     Not actively bothersome.  Colonoscopy last year did not show any concerning findings.  Continue to encourage fiber diet and increased hydration         Encounter for general adult medical examination with abnormal findings - Primary     Up-to-date on all preventative maintenance.  Colonoscopy will be due again in 2008         Relevant Orders    Lipid Profile (Chol, Trig, HDL, LDL calc)  "   Comprehensive metabolic panel (BMP + Alb, Alk Phos, ALT, AST, Total. Bili, TP)    CBC with platelets      Other Visit Diagnoses     Lipid screening        Relevant Orders    Lipid Profile (Chol, Trig, HDL, LDL calc)    Screening for prostate cancer        Relevant Orders    PSA, screen    Inflamed seborrheic keratosis    -patient declined cryotherapy.  Reassurance given.          Patient has been advised of split billing requirements and indicates understanding: Yes    COUNSELING:   Reviewed preventive health counseling, as reflected in patient instructions       Regular exercise       Healthy diet/nutrition       Vision screening    Estimated body mass index is 29.66 kg/m  as calculated from the following:    Height as of this encounter: 1.746 m (5' 8.75\").    Weight as of this encounter: 90.4 kg (199 lb 6.4 oz).     Weight management plan: Discussed healthy diet and exercise guidelines    He reports that he has never smoked. He has never used smokeless tobacco.      Counseling Resources:  ATP IV Guidelines  Pooled Cohorts Equation Calculator  FRAX Risk Assessment  ICSI Preventive Guidelines  Dietary Guidelines for Americans, 2010  USDA's MyPlate  ASA Prophylaxis  Lung CA Screening    Carleen Alan DO  [facility]  "

## 2022-03-11 NOTE — ASSESSMENT & PLAN NOTE
Not actively bothersome.  Colonoscopy last year did not show any concerning findings.  Continue to encourage fiber diet and increased hydration

## 2022-03-11 NOTE — ASSESSMENT & PLAN NOTE
Blood pressure is still mildly elevated at times.  We discussed doubling his dose of losartan with hydrochlorothiazide and for more even control we are going to have him take 1 tablet twice daily.  If too much urination at bedtime he can take the double dose in the morning instead.  We will have him come back in 6 months for follow-up for blood pressure but sooner if he is having any dizziness or lightheadedness or any other abnormal symptoms he will let me know so we can repeat labs sooner.  Encouraged hydration and cardiovascular exercise.  Pulse rate in the 40s to 50s but patient is asymptomatic.

## 2022-03-11 NOTE — ASSESSMENT & PLAN NOTE
Continue with atorvastatin 20 mg every evening.  Tolerating well without myalgias.  Lipid panel and LFTs checked today.  Refill as needed and follow-up in a year

## 2022-03-11 NOTE — PATIENT INSTRUCTIONS
Fiber Chart- goal: 20-30g of fiber per day    Fruits Serving size Fiber (grams per serving)  Apple w/skin 1 medium 3.7   Apple w/o skin 1 medium 2.4  Applesauce   cup 2.0  Apricots 3 medium 2.5  Banana 1 medium 2.7  Blueberries (raw) 1 cup 4.0  Cantaloupe 1 cup (pieces) 1.3  Cherries 10 cherries 1.3  Fruit salad/fruit cup   cup 1.3  Grapefruit   medium 1.3  Grapes 1 cup 1.2  Honeydew melon 1 cup (pieces) 1.0  Mandarin oranges   cup 1.0  Nectarine 1 medium 2.2  Orange 1 medium 3.0  Peach 1 medium 1.7  Pear 1 medium 4.0  Pineapple 1 cup (pieces) 2.0  Plum 1 medium 1.0  Prunes (dried) 10 prunes 6.0  Raisins (seedless) 2/3 cup 4.0  Raspberries 1 cup 8.4  Strawberries 1 cup 3.4  Tangerine 1 medium 2.0  Watermelon 1 cup (pieces) 0.8      Vegetables Serving size Fiber (grams per serving)  Artichoke, boiled 1 medium 6.2  Asparagus, boiled   cup (6 damon) 1.4  Baked beans 1 cup 14.0  Broccoli, boiled   cup 2.3  Grapevine sprouts, boiled   cup 2.0  Carrot 1 medium 2.0  Cauliflower, boiled   cup 1.7  Celery 1 stalk (7 inch) 0.7  Coleslaw   cup 1.0  Corn, on the cob 1 ear 2.0  Cucumber   cup (slices) 0.5  Eggplant, boiled   cup 1.0  Green beans, boiled   cup 2.0  Lima beans, boiled 1 cup 13.2  Lettuce   cup (pieces) 0.5  Mushrooms   cup (pieces) 0.4  Onions, boiled   cup 1.0  Peas, green   cup 4.0  Prabhakar beans, boiled 1 cup 14.7  Potato, baked w/ skin 1 medium 5.0  Potato, boiled 1 medium 2.0  Potato salad   cup 1.6  Vegetables (cont.) Serving size Fiber (grams per serving)  Pumpkin, canned   cup 5.0  Spinach, boiled   cup 2.2  Spinach, raw   cup 0.8  Squash, winter   cup 3.0  Sweet potato, baked 1 medium 3.0  Tomato, raw 1 medium 1.0  Cereal Serving size Fiber (grams per serving)  All-Bran, Michelle s   cup 10.0  Alpha-Bits 1 cup 1.0  Banana Nut Crunch 1 cup 4.0  Bran Buds, Michelle s 1/3 cup 12.0  Cheerios 1 cup 3.0  Corn Pop 1 cup 0.0  Cracklin  Oat Bran, Michelle s   cup 5.6  Cream of Wheat 1 pack 1.0  Fiber One, General  [place]   cup 13.0  Frosted Mini-Wheats 5 biscuits 5.0  Honey Nut Cheerios, [employer] 1 cup 2.0  Instant Oatmeal 1 pack 3.0  Multi-Grain Cheerios 1 cup 3.0  Christianity Shredded Wheat 3 biscuits 7.3  Raisin Bran, [employer]   cup 3.0  Raisin bran, Michelle s 1 cup 8.2   Breads/Grains Serving size Fiber (grams per serving)  Bagel (most bagels) 1 bagel 1.5  English muffin, Shade 1 muffin 1.5  Scottish bread 1 slice 0.5  Italian, Bakery Light 1 slice 2.5  Multi-grain 1 slice 1.5  Pancakes 1 medium-large 1.0  Neeta, white 1 6  diameter 1.0  Seven grain, Bran stephanie 1 slice 3.0  Wheat, Bakery Light 1 slice 2.5  White 1 slice 1.0  Whole wheat 1 slice 2.0   Pasta Serving size Fiber (grams per serving)  Elbow macaroni, Marie Grain   cup 2.0  Macaroni 1 cup 1.8  Macaroni, whole wheat 1 cup 4.0  Spaghetti, whole wheat 1 cup 6.3  Brown rice, long grain 1 cup 3.5  White rice 1 cup 1.0    MEDITERRANEAN STYLE DIET    The Mediterranean diet features foods eaten in [place], [place], [place], and other countries that border the [place] Sea. It emphasizes eating a diet rich in fruits, vegetables, nuts, and high-fiber grains, and limits meat, cheese, and sweets.     The Mediterranean diet may:   - Prevent heart disease and lower the risk of a heart attack or stroke.   - Prevent type 2 diabetes.   - Prevent Alzheimer's disease and other dementia.   - Prevent depression.   - Prevent Parkinson's disease.     This diet contains more fat than other heart-healthy diets. But the fats are mainly from nuts, unsaturated oils, such as fish oils, olive oil, and certain nut or seed oils (such as canola, soybean, or flaxseed oil). These types of oils may help protect the heart and blood vessels.     Follow-up care is a key part of your treatment and safety. Be sure to make and go to all appointments, and call your doctor if you are having problems. It's also a good idea to know your test results and keep a list of the medicines you  take.     What to eat:  - Eat a variety of fruits and vegetables each day, such as grapes, blueberries, tomatoes, broccoli, peppers, figs, olives, spinach, eggplant, beans, lentils, and chickpeas.   - Eat a variety of whole-grain foods each day, such as oats, brown rice, and whole wheat bread, pasta, and couscous.   - Eat fish at least 2 times a week. Try tuna, salmon, mackerel, lake trout, herring, or sardines.   - Eat moderate amounts of low-fat dairy products each day or weekly, such as milk, cheese, or yogurt.   - Eat moderate amounts of poultry and eggs every 2 days or weekly.   - Choose healthy (unsaturated) fats, such as nuts, olive oil and certain nut or seed oils like canola, soybean, and flaxseed.   - Limit unhealthy (saturated) fats, such as butter, palm oil, and coconut oil. And limit fats found in animal products, such as meat and dairy products made with whole milk. Try to eat red meat only a few times a month in very small amounts.   - Limit sweets and desserts to only a few times a week. This includes sugar-sweetened drinks like soda.   - The Mediterranean diet may also include red wine with your meal--1 glass each day for women and up to 2 glasses a day for men.     Tips for changing your diet:   - Dip bread in a mix of olive oil and fresh herbs instead of using butter.   - Add avocado slices to your sandwich instead of fitzgerald.   - Have fish for lunch or dinner instead of red meat. Brush the fish with olive oil, and broil or grill it.   - Sprinkle your salad with seeds or nuts instead of cheese.   - Cook with olive or canola oil instead of butter or oils that are high in saturated fat.   - Switch from 2% milk or whole milk to 1% or fat-free milk.   - Dip raw vegetables in a vinaigrette dressing or hummus instead of dips made from mayonnaise or sour cream.   - Have a piece of fruit for dessert instead of a piece of cake. Try baked apples, or have some dried fruit.     Part of the Mediterranean diet is  being active. Get at least 30 minutes of exercise on most days of the week. Walking is a good choice. You also may want to do other activities, such as running, swimming, cycling, or playing tennis or team sports.

## 2022-03-14 LAB — HBA1C MFR BLD: 6 %

## 2022-08-27 DIAGNOSIS — E78.2 MIXED HYPERLIPIDEMIA: ICD-10-CM

## 2022-08-27 RX ORDER — ATORVASTATIN CALCIUM 20 MG/1
TABLET, FILM COATED ORAL
Qty: 90 TABLET | Refills: 1 | Status: SHIPPED | OUTPATIENT
Start: 2022-08-27 | End: 2023-02-24

## 2022-08-28 NOTE — TELEPHONE ENCOUNTER
"Last Written Prescription Date:  7/30/2021  Last Fill Quantity: 90,  # refills: 4  Last office visit provider: 3/11/2022 with PCP Dr PACO Alan    Requested Prescriptions   Pending Prescriptions Disp Refills     atorvastatin (LIPITOR) 20 MG tablet [Pharmacy Med Name: ATORVASTATIN 20MG TABLETS] 90 tablet 4     Sig: TAKE 1 TABLET BY MOUTH EVERY NIGHT AT BEDTIME       Statins Protocol Passed - 8/27/2022  5:22 AM        Passed - LDL on file in past 12 months     Recent Labs   Lab Test 03/11/22  0939   LDL 95             Passed - No abnormal creatine kinase in past 12 months     No lab results found.             Passed - Recent (12 mo) or future (30 days) visit within the authorizing provider's specialty     Patient has had an office visit with the authorizing provider or a provider within the authorizing providers department within the previous 12 mos or has a future within next 30 days. See \"Patient Info\" tab in inbasket, or \"Choose Columns\" in Meds & Orders section of the refill encounter.              Passed - Medication is active on med list        Passed - Patient is age 18 or older             Aleta Adame RN 08/27/22 11:16 PM  "

## 2022-09-25 ENCOUNTER — HEALTH MAINTENANCE LETTER (OUTPATIENT)
Age: 62
End: 2022-09-25

## 2022-11-25 DIAGNOSIS — I10 ESSENTIAL HYPERTENSION, BENIGN: ICD-10-CM

## 2022-11-25 RX ORDER — LOSARTAN POTASSIUM AND HYDROCHLOROTHIAZIDE 12.5; 5 MG/1; MG/1
TABLET ORAL
Qty: 180 TABLET | Refills: 0 | Status: SHIPPED | OUTPATIENT
Start: 2022-11-25 | End: 2023-02-23

## 2022-11-25 NOTE — TELEPHONE ENCOUNTER
"Last Written Prescription Date:  3/11/22  Last Fill Quantity: 180,  # refills: 2   Last office visit provider:  3/11/22     Requested Prescriptions   Pending Prescriptions Disp Refills     losartan-hydrochlorothiazide (HYZAAR) 50-12.5 MG tablet [Pharmacy Med Name: LOSARTAN/HCTZ 50/12.5MG TABLETS] 180 tablet 2     Sig: TAKE 1 TABLET BY MOUTH TWICE DAILY       Angiotensin-II Receptors Passed - 11/25/2022  5:23 AM        Passed - Last blood pressure under 140/90 in past 12 months     BP Readings from Last 3 Encounters:   03/11/22 131/77   07/30/21 132/83   12/01/20 118/76                 Passed - Recent (12 mo) or future (30 days) visit within the authorizing provider's specialty     Patient has had an office visit with the authorizing provider or a provider within the authorizing providers department within the previous 12 mos or has a future within next 30 days. See \"Patient Info\" tab in inLatinComicset, or \"Choose Columns\" in Meds & Orders section of the refill encounter.              Passed - Medication is active on med list        Passed - Patient is age 18 or older        Passed - Normal serum creatinine on file in past 12 months     Recent Labs   Lab Test 03/11/22  0939   CR 0.85       Ok to refill medication if creatinine is low          Passed - Normal serum potassium on file in past 12 months     Recent Labs   Lab Test 03/11/22  0939   POTASSIUM 3.8                   Diuretics (Including Combos) Protocol Passed - 11/25/2022  5:23 AM        Passed - Blood pressure under 140/90 in past 12 months     BP Readings from Last 3 Encounters:   03/11/22 131/77   07/30/21 132/83   12/01/20 118/76                 Passed - Recent (12 mo) or future (30 days) visit within the authorizing provider's specialty     Patient has had an office visit with the authorizing provider or a provider within the authorizing providers department within the previous 12 mos or has a future within next 30 days. See \"Patient Info\" tab in [facility]Com[facility], or " "\"Choose Columns\" in Meds & Orders section of the refill encounter.              Passed - Medication is active on med list        Passed - Patient is age 18 or older        Passed - Normal serum creatinine on file in past 12 months     Recent Labs   Lab Test 03/11/22  0939   CR 0.85              Passed - Normal serum potassium on file in past 12 months     Recent Labs   Lab Test 03/11/22  0939   POTASSIUM 3.8                    Passed - Normal serum sodium on file in past 12 months     Recent Labs   Lab Test 03/11/22  0939                      Leena Khan RN 11/25/22 4:10 PM  "

## 2023-02-23 DIAGNOSIS — I10 ESSENTIAL HYPERTENSION, BENIGN: ICD-10-CM

## 2023-02-23 DIAGNOSIS — E78.2 MIXED HYPERLIPIDEMIA: ICD-10-CM

## 2023-02-23 NOTE — TELEPHONE ENCOUNTER
Faxed refill request from [facility] for atorvastatin and losartan/hctz.  rx queued up for approval.

## 2023-02-24 RX ORDER — ATORVASTATIN CALCIUM 20 MG/1
TABLET, FILM COATED ORAL
Qty: 90 TABLET | Refills: 0 | Status: SHIPPED | OUTPATIENT
Start: 2023-02-24 | End: 2023-02-24

## 2023-02-24 RX ORDER — ATORVASTATIN CALCIUM 20 MG/1
TABLET, FILM COATED ORAL
Qty: 90 TABLET | Refills: 0 | Status: SHIPPED | OUTPATIENT
Start: 2023-02-24 | End: 2023-03-13

## 2023-02-24 RX ORDER — LOSARTAN POTASSIUM AND HYDROCHLOROTHIAZIDE 12.5; 5 MG/1; MG/1
1 TABLET ORAL 2 TIMES DAILY
Qty: 180 TABLET | Refills: 0 | Status: SHIPPED | OUTPATIENT
Start: 2023-02-24 | End: 2023-02-24

## 2023-02-24 RX ORDER — LOSARTAN POTASSIUM AND HYDROCHLOROTHIAZIDE 12.5; 5 MG/1; MG/1
1 TABLET ORAL 2 TIMES DAILY
Qty: 180 TABLET | Refills: 0 | Status: SHIPPED | OUTPATIENT
Start: 2023-02-24 | End: 2023-03-13

## 2023-02-24 NOTE — TELEPHONE ENCOUNTER
Please inform patient to establish care with new provider at clinic his previous provider has left clinic.

## 2023-02-24 NOTE — TELEPHONE ENCOUNTER
"Former patient of [employer] & has not established care with another provider.  Please assign refill request to covering provider per clinic standard process.    Routing refill request to provider for review/approval because:  No PCP    Last Written Prescription Date:  8/27/22  Last Fill Quantity: 90,  # refills: 1   Last office visit provider:  3/11/22     Last Written Prescription Date:  11/25/22  Last Fill Quantity: 180,  # refills: 0   Last office visit provider:  3/11/22    Requested Prescriptions   Pending Prescriptions Disp Refills     atorvastatin (LIPITOR) 20 MG tablet 90 tablet 1     Sig: TAKE 1 TABLET BY MOUTH EVERY NIGHT AT BEDTIME       Statins Protocol Passed - 2/24/2023  3:21 PM        Passed - LDL on file in past 12 months     Recent Labs   Lab Test 03/11/22  0939   LDL 95             Passed - No abnormal creatine kinase in past 12 months     No lab results found.             Passed - Recent (12 mo) or future (30 days) visit within the authorizing provider's specialty     Patient has had an office visit with the authorizing provider or a provider within the authorizing providers department within the previous 12 mos or has a future within next 30 days. See \"Patient Info\" tab in inbasket, or \"Choose Columns\" in Meds & Orders section of the refill encounter.              Passed - Medication is active on med list        Passed - Patient is age 18 or older           losartan-hydrochlorothiazide (HYZAAR) 50-12.5 MG tablet 180 tablet 1     Sig: Take 1 tablet by mouth 2 times daily       Angiotensin-II Receptors Passed - 2/24/2023  3:21 PM        Passed - Last blood pressure under 140/90 in past 12 months     BP Readings from Last 3 Encounters:   03/11/22 131/77   07/30/21 132/83   12/01/20 118/76                 Passed - Recent (12 mo) or future (30 days) visit within the authorizing provider's specialty     Patient has had an office visit with the authorizing provider or a provider within the authorizing " "providers department within the previous 12 mos or has a future within next 30 days. See \"Patient Info\" tab in inbasket, or \"Choose Columns\" in Meds & Orders section of the refill encounter.              Passed - Medication is active on med list        Passed - Patient is age 18 or older        Passed - Normal serum creatinine on file in past 12 months     Recent Labs   Lab Test 03/11/22  0939   CR 0.85       Ok to refill medication if creatinine is low          Passed - Normal serum potassium on file in past 12 months     Recent Labs   Lab Test 03/11/22  0939   POTASSIUM 3.8                   Diuretics (Including Combos) Protocol Passed - 2/24/2023  3:21 PM        Passed - Blood pressure under 140/90 in past 12 months     BP Readings from Last 3 Encounters:   03/11/22 131/77   07/30/21 132/83   12/01/20 118/76                 Passed - Recent (12 mo) or future (30 days) visit within the authorizing provider's specialty     Patient has had an office visit with the authorizing provider or a provider within the authorizing providers department within the previous 12 mos or has a future within next 30 days. See \"Patient Info\" tab in inbasket, or \"Choose Columns\" in Meds & Orders section of the refill encounter.              Passed - Medication is active on med list        Passed - Patient is age 18 or older        Passed - Normal serum creatinine on file in past 12 months     Recent Labs   Lab Test 03/11/22  0939   CR 0.85              Passed - Normal serum potassium on file in past 12 months     Recent Labs   Lab Test 03/11/22  0939   POTASSIUM 3.8                    Passed - Normal serum sodium on file in past 12 months     Recent Labs   Lab Test 03/11/22  0939                      Dileep Gallego RN 02/24/23 3:27 PM  "

## 2023-03-13 ENCOUNTER — OFFICE VISIT (OUTPATIENT)
Dept: FAMILY MEDICINE | Facility: CLINIC | Age: 63
End: 2023-03-13
Payer: COMMERCIAL

## 2023-03-13 VITALS
RESPIRATION RATE: 16 BRPM | DIASTOLIC BLOOD PRESSURE: 77 MMHG | SYSTOLIC BLOOD PRESSURE: 124 MMHG | HEART RATE: 55 BPM | BODY MASS INDEX: 28.88 KG/M2 | WEIGHT: 195 LBS | HEIGHT: 69 IN | OXYGEN SATURATION: 98 %

## 2023-03-13 DIAGNOSIS — Z12.5 SCREENING FOR PROSTATE CANCER: ICD-10-CM

## 2023-03-13 DIAGNOSIS — I10 ESSENTIAL HYPERTENSION, BENIGN: ICD-10-CM

## 2023-03-13 DIAGNOSIS — R73.03 PREDIABETES: ICD-10-CM

## 2023-03-13 DIAGNOSIS — E66.3 OVERWEIGHT: Chronic | ICD-10-CM

## 2023-03-13 DIAGNOSIS — E78.2 MIXED HYPERLIPIDEMIA: ICD-10-CM

## 2023-03-13 DIAGNOSIS — Z00.00 ROUTINE GENERAL MEDICAL EXAMINATION AT A HEALTH CARE FACILITY: Primary | ICD-10-CM

## 2023-03-13 PROBLEM — D12.0 BENIGN NEOPLASM OF CECUM: Status: ACTIVE | Noted: 2021-06-25

## 2023-03-13 LAB
ALBUMIN SERPL BCG-MCNC: 4.5 G/DL (ref 3.5–5.2)
ALP SERPL-CCNC: 46 U/L (ref 40–129)
ALT SERPL W P-5'-P-CCNC: 24 U/L (ref 10–50)
ANION GAP SERPL CALCULATED.3IONS-SCNC: 11 MMOL/L (ref 7–15)
AST SERPL W P-5'-P-CCNC: 30 U/L (ref 10–50)
BASOPHILS # BLD AUTO: 0.1 10E3/UL (ref 0–0.2)
BASOPHILS NFR BLD AUTO: 1 %
BILIRUB SERPL-MCNC: 1.6 MG/DL
BUN SERPL-MCNC: 21.1 MG/DL (ref 8–23)
CALCIUM SERPL-MCNC: 9.1 MG/DL (ref 8.8–10.2)
CHLORIDE SERPL-SCNC: 101 MMOL/L (ref 98–107)
CHOLEST SERPL-MCNC: 184 MG/DL
CREAT SERPL-MCNC: 0.95 MG/DL (ref 0.67–1.17)
DEPRECATED HCO3 PLAS-SCNC: 26 MMOL/L (ref 22–29)
EOSINOPHIL # BLD AUTO: 0.3 10E3/UL (ref 0–0.7)
EOSINOPHIL NFR BLD AUTO: 6 %
ERYTHROCYTE [DISTWIDTH] IN BLOOD BY AUTOMATED COUNT: 12.8 % (ref 10–15)
GFR SERPL CREATININE-BSD FRML MDRD: >90 ML/MIN/1.73M2
GLUCOSE SERPL-MCNC: 116 MG/DL (ref 70–99)
HBA1C MFR BLD: 5.8 % (ref 0–5.6)
HCT VFR BLD AUTO: 42.1 % (ref 40–53)
HDLC SERPL-MCNC: 58 MG/DL
HGB BLD-MCNC: 14.6 G/DL (ref 13.3–17.7)
IMM GRANULOCYTES # BLD: 0 10E3/UL
IMM GRANULOCYTES NFR BLD: 0 %
LDLC SERPL CALC-MCNC: 95 MG/DL
LYMPHOCYTES # BLD AUTO: 2.2 10E3/UL (ref 0.8–5.3)
LYMPHOCYTES NFR BLD AUTO: 42 %
MCH RBC QN AUTO: 31.3 PG (ref 26.5–33)
MCHC RBC AUTO-ENTMCNC: 34.7 G/DL (ref 31.5–36.5)
MCV RBC AUTO: 90 FL (ref 78–100)
MONOCYTES # BLD AUTO: 0.5 10E3/UL (ref 0–1.3)
MONOCYTES NFR BLD AUTO: 10 %
NEUTROPHILS # BLD AUTO: 2.1 10E3/UL (ref 1.6–8.3)
NEUTROPHILS NFR BLD AUTO: 40 %
NONHDLC SERPL-MCNC: 126 MG/DL
PLATELET # BLD AUTO: 233 10E3/UL (ref 150–450)
POTASSIUM SERPL-SCNC: 3.6 MMOL/L (ref 3.4–5.3)
PROT SERPL-MCNC: 7.2 G/DL (ref 6.4–8.3)
PSA SERPL DL<=0.01 NG/ML-MCNC: 0.52 NG/ML (ref 0–4.5)
RBC # BLD AUTO: 4.66 10E6/UL (ref 4.4–5.9)
SODIUM SERPL-SCNC: 138 MMOL/L (ref 136–145)
TRIGL SERPL-MCNC: 155 MG/DL
WBC # BLD AUTO: 5.3 10E3/UL (ref 4–11)

## 2023-03-13 PROCEDURE — 36415 COLL VENOUS BLD VENIPUNCTURE: CPT | Performed by: FAMILY MEDICINE

## 2023-03-13 PROCEDURE — 85025 COMPLETE CBC W/AUTO DIFF WBC: CPT | Performed by: FAMILY MEDICINE

## 2023-03-13 PROCEDURE — 80053 COMPREHEN METABOLIC PANEL: CPT | Performed by: FAMILY MEDICINE

## 2023-03-13 PROCEDURE — 83036 HEMOGLOBIN GLYCOSYLATED A1C: CPT | Performed by: FAMILY MEDICINE

## 2023-03-13 PROCEDURE — 80061 LIPID PANEL: CPT | Performed by: FAMILY MEDICINE

## 2023-03-13 PROCEDURE — G0103 PSA SCREENING: HCPCS | Performed by: FAMILY MEDICINE

## 2023-03-13 PROCEDURE — 99213 OFFICE O/P EST LOW 20 MIN: CPT | Mod: 25 | Performed by: FAMILY MEDICINE

## 2023-03-13 PROCEDURE — 99396 PREV VISIT EST AGE 40-64: CPT | Performed by: FAMILY MEDICINE

## 2023-03-13 RX ORDER — ATORVASTATIN CALCIUM 20 MG/1
TABLET, FILM COATED ORAL
Qty: 90 TABLET | Refills: 3 | Status: SHIPPED | OUTPATIENT
Start: 2023-03-13 | End: 2024-06-21

## 2023-03-13 RX ORDER — LOSARTAN POTASSIUM AND HYDROCHLOROTHIAZIDE 12.5; 5 MG/1; MG/1
1 TABLET ORAL 2 TIMES DAILY
Qty: 180 TABLET | Refills: 3 | Status: SHIPPED | OUTPATIENT
Start: 2023-03-13 | End: 2024-04-15

## 2023-03-13 ASSESSMENT — ENCOUNTER SYMPTOMS
PALPITATIONS: 0
MYALGIAS: 0
DIZZINESS: 0
NAUSEA: 0
DYSURIA: 0
HEARTBURN: 0
COUGH: 0
HEMATURIA: 0
JOINT SWELLING: 0
CONSTIPATION: 0
ABDOMINAL PAIN: 0
DIARRHEA: 0
EYE PAIN: 0
FREQUENCY: 0
NERVOUS/ANXIOUS: 0
ARTHRALGIAS: 0
WEAKNESS: 0
PARESTHESIAS: 0
SORE THROAT: 0
SHORTNESS OF BREATH: 0
CHILLS: 0
FEVER: 0
HEMATOCHEZIA: 0
HEADACHES: 0

## 2023-03-13 NOTE — PROGRESS NOTES
"  ASSESSMENT/PLAN:       ICD-10-CM    1. Routine general medical examination at a health care facility  Z00.00 Comprehensive metabolic panel (BMP + Alb, Alk Phos, ALT, AST, Total. Bili, TP)      2. Mixed hyperlipidemia  E78.2 atorvastatin (LIPITOR) 20 MG tablet     Lipid panel      3. Essential hypertension, benign  I10 losartan-hydrochlorothiazide (HYZAAR) 50-12.5 MG tablet     Comprehensive metabolic panel (BMP + Alb, Alk Phos, ALT, AST, Total. Bili, TP)      4. Overweight  E66.3       5. Screening for prostate cancer  Z12.5 PSA, screen      6. Prediabetes  R73.03 Hemoglobin A1c     CBC with platelets and differential        Medical decision making: Patient with hypertension and hyperlipidemia and prediabetes presents today for follow-up  1: Hypertension: Blood pressure is in optimal control.  Continue losartan-HCTZ twice a day.  Medication refilled  2: Hyperlipidemia: Continue Lipitor.  Will check labs today  3: Prediabetes and overweight: Discussed lifestyle modification including watching caloric intake,, options for plant-based protein and exercising.  Patient verbalizes understanding.    COUNSELING:   Reviewed preventive health counseling, as reflected in patient instructions       Regular exercise       Healthy diet/nutrition       Vision screening       Aspirin prophylaxis        Prostate cancer screening      BMI:   Estimated body mass index is 28.8 kg/m  as calculated from the following:    Height as of this encounter: 1.753 m (5' 9\").    Weight as of this encounter: 88.5 kg (195 lb).   Weight management plan: Discussed healthy diet and exercise guidelines      He reports that he has never smoked. He has never used smokeless tobacco.      SUBJECTIVE:   CC: Richard is an 62 year old who presents for preventative health visit.   Patient has been advised of split billing requirements and indicates understanding: Yes  Healthy Habits:     Getting at least 3 servings of Calcium per day:  NO    Bi-annual eye exam:  " Yes    Dental care twice a year:  Yes    Sleep apnea or symptoms of sleep apnea:  None    Diet:  Regular (no restrictions)    Frequency of exercise:  4-5 days/week    Duration of exercise:  15-30 minutes    Taking medications regularly:  Yes    Barriers to taking medications:  None    Medication side effects:  None    PHQ-2 Total Score: 0    Additional concerns today:  No      Today's PHQ-2 Score:   PHQ-2 ( 1999 [employer]) 3/13/2023   Q1: Little interest or pleasure in doing things 0   Q2: Feeling down, depressed or hopeless 0   PHQ-2 Score 0   PHQ-2 Total Score (12-17 Years)- Positive if 3 or more points; Administer PHQ-A if positive -   Q1: Little interest or pleasure in doing things Not at all   Q2: Feeling down, depressed or hopeless Not at all   PHQ-2 Score 0           Social History     Tobacco Use     Smoking status: Never     Smokeless tobacco: Never   Substance Use Topics     Alcohol use: Yes     Alcohol/week: 1.7 standard drinks         Alcohol Use 3/13/2023   Prescreen: >3 drinks/day or >7 drinks/week? No   No flowsheet data found.    Last PSA:   Prostate Specific Antigen Screen   Date Value Ref Range Status   03/11/2022 0.49 0.00 - 4.50 ug/L Final       Reviewed orders with patient. Reviewed health maintenance and updated orders accordingly - Yes  BP Readings from Last 3 Encounters:   03/13/23 124/77   03/11/22 131/77   07/30/21 132/83    Wt Readings from Last 3 Encounters:   03/13/23 88.5 kg (195 lb)   03/11/22 90.4 kg (199 lb 6.4 oz)   07/30/21 88.7 kg (195 lb 9.6 oz)                  Patient Active Problem List   Diagnosis     Hypercholesterolemia     Essential hypertension, benign     Overweight     External hemorrhoids     Impaired fasting glucose     Metabolic syndrome X     Encounter for general adult medical examination with abnormal findings     Bradycardia     Benign neoplasm of cecum     Past Surgical History:   Procedure Laterality Date     REMOVAL OF SPERM DUCT(S)      Description: Surgery Of  Male Genitalia Vasectomy;  Recorded: 11/17/2008;     WISDOM TOOTH EXTRACTION         Social History     Tobacco Use     Smoking status: Never     Smokeless tobacco: Never   Substance Use Topics     Alcohol use: Yes     Alcohol/week: 1.7 standard drinks     Family History   Problem Relation Age of Onset     Hypertension Mother      Prostate Cancer Father      Hypertension Father      Skin Cancer Father      Diabetes Type 2  Son      Lymphoma Brother         non hodgkins          Current Outpatient Medications   Medication Sig Dispense Refill     aspirin 81 MG EC tablet [ASPIRIN 81 MG EC TABLET] Take 81 mg by mouth daily.       atorvastatin (LIPITOR) 20 MG tablet TAKE 1 TABLET BY MOUTH EVERY NIGHT AT BEDTIME 90 tablet 3     losartan-hydrochlorothiazide (HYZAAR) 50-12.5 MG tablet Take 1 tablet by mouth 2 times daily 180 tablet 3       Reviewed and updated as needed this visit by clinical staff   Tobacco  Allergies    Med Hx  Surg Hx           Reviewed and updated as needed this visit by Provider       Med Hx  Surg Hx          Past Medical History:   Diagnosis Date     Hernia, ventral 10/19/2017      Past Surgical History:   Procedure Laterality Date     REMOVAL OF SPERM DUCT(S)      Description: Surgery Of Male Genitalia Vasectomy;  Recorded: 11/17/2008;     WISDOM TOOTH EXTRACTION         Review of Systems   Constitutional: Negative for chills and fever.   HENT: Negative for congestion, ear pain, hearing loss and sore throat.    Eyes: Negative for pain and visual disturbance.   Respiratory: Negative for cough and shortness of breath.    Cardiovascular: Negative for chest pain, palpitations and peripheral edema.   Gastrointestinal: Negative for abdominal pain, constipation, diarrhea, heartburn, hematochezia and nausea.   Genitourinary: Negative for dysuria, frequency, genital sores, hematuria, impotence, penile discharge and urgency.   Musculoskeletal: Negative for arthralgias, joint swelling and myalgias.  "  Skin: Negative for rash.   Neurological: Negative for dizziness, weakness, headaches and paresthesias.   Psychiatric/Behavioral: Negative for mood changes. The patient is not nervous/anxious.        OBJECTIVE:   /77   Pulse 55   Resp 16   Ht 1.753 m (5' 9\")   Wt 88.5 kg (195 lb)   SpO2 98%   BMI 28.80 kg/m      Physical Exam  GENERAL: healthy, alert and no distress  EYES: Eyes grossly normal to inspection, PERRL and conjunctivae and sclerae normal  HENT: ear canals and TM's normal, nose and mouth without ulcers or lesions  NECK: no adenopathy, no asymmetry, masses, or scars and thyroid normal to palpation  RESP: lungs clear to auscultation - no rales, rhonchi or wheezes  CV: regular rate and rhythm, normal S1 S2, no S3 or S4, no murmur, click or rub, no peripheral edema and peripheral pulses strong  ABDOMEN: soft, nontender, no hepatosplenomegaly, no masses and bowel sounds normal  MS: no gross musculoskeletal defects noted, no edema  PSYCH: mentation appears normal, affect normal/bright          Ester Rich MD  [facility]  "

## 2024-04-15 DIAGNOSIS — I10 ESSENTIAL HYPERTENSION, BENIGN: ICD-10-CM

## 2024-04-15 RX ORDER — LOSARTAN POTASSIUM AND HYDROCHLOROTHIAZIDE 12.5; 5 MG/1; MG/1
1 TABLET ORAL 2 TIMES DAILY
Qty: 180 TABLET | Refills: 3 | Status: SHIPPED | OUTPATIENT
Start: 2024-04-15

## 2024-04-25 ENCOUNTER — OFFICE VISIT (OUTPATIENT)
Dept: FAMILY MEDICINE | Facility: CLINIC | Age: 64
End: 2024-04-25
Payer: COMMERCIAL

## 2024-04-25 VITALS
OXYGEN SATURATION: 96 % | SYSTOLIC BLOOD PRESSURE: 112 MMHG | HEART RATE: 46 BPM | BODY MASS INDEX: 26.96 KG/M2 | HEIGHT: 69 IN | RESPIRATION RATE: 16 BRPM | WEIGHT: 182 LBS | DIASTOLIC BLOOD PRESSURE: 73 MMHG

## 2024-04-25 DIAGNOSIS — Z12.5 SCREENING FOR PROSTATE CANCER: ICD-10-CM

## 2024-04-25 DIAGNOSIS — I10 ESSENTIAL HYPERTENSION, BENIGN: ICD-10-CM

## 2024-04-25 DIAGNOSIS — Z00.00 ROUTINE GENERAL MEDICAL EXAMINATION AT A HEALTH CARE FACILITY: Primary | ICD-10-CM

## 2024-04-25 DIAGNOSIS — R73.03 PREDIABETES: ICD-10-CM

## 2024-04-25 DIAGNOSIS — Z11.4 SCREENING FOR HIV (HUMAN IMMUNODEFICIENCY VIRUS): ICD-10-CM

## 2024-04-25 DIAGNOSIS — E78.00 PURE HYPERCHOLESTEROLEMIA: ICD-10-CM

## 2024-04-25 LAB — HBA1C MFR BLD: 5.6 % (ref 0–5.6)

## 2024-04-25 PROCEDURE — G0103 PSA SCREENING: HCPCS | Performed by: FAMILY MEDICINE

## 2024-04-25 PROCEDURE — 83036 HEMOGLOBIN GLYCOSYLATED A1C: CPT | Performed by: FAMILY MEDICINE

## 2024-04-25 PROCEDURE — 99214 OFFICE O/P EST MOD 30 MIN: CPT | Mod: 25 | Performed by: FAMILY MEDICINE

## 2024-04-25 PROCEDURE — 99396 PREV VISIT EST AGE 40-64: CPT | Performed by: FAMILY MEDICINE

## 2024-04-25 PROCEDURE — 36415 COLL VENOUS BLD VENIPUNCTURE: CPT | Performed by: FAMILY MEDICINE

## 2024-04-25 PROCEDURE — 80053 COMPREHEN METABOLIC PANEL: CPT | Performed by: FAMILY MEDICINE

## 2024-04-25 PROCEDURE — 87389 HIV-1 AG W/HIV-1&-2 AB AG IA: CPT | Performed by: FAMILY MEDICINE

## 2024-04-25 PROCEDURE — 80061 LIPID PANEL: CPT | Performed by: FAMILY MEDICINE

## 2024-04-25 SDOH — HEALTH STABILITY: PHYSICAL HEALTH: ON AVERAGE, HOW MANY DAYS PER WEEK DO YOU ENGAGE IN MODERATE TO STRENUOUS EXERCISE (LIKE A BRISK WALK)?: 6 DAYS

## 2024-04-25 SDOH — HEALTH STABILITY: PHYSICAL HEALTH: ON AVERAGE, HOW MANY MINUTES DO YOU ENGAGE IN EXERCISE AT THIS LEVEL?: 90 MIN

## 2024-04-25 ASSESSMENT — SOCIAL DETERMINANTS OF HEALTH (SDOH): HOW OFTEN DO YOU GET TOGETHER WITH FRIENDS OR RELATIVES?: THREE TIMES A WEEK

## 2024-04-25 NOTE — PROGRESS NOTES
"Preventive Care Visit  [facility]  Ester Rich MD, Family Medicine  Apr 25, 2024      Assessment & Plan     ICD-10-CM    1. Routine general medical examination at a health care facility  Z00.00       2. Screening for HIV (human immunodeficiency virus)  Z11.4 HIV Antigen Antibody Combo     HIV Antigen Antibody Combo      3. Pure hypercholesterolemia  E78.00 Lipid panel reflex to direct LDL Fasting     Lipid panel reflex to direct LDL Fasting      4. Essential hypertension, benign  I10 Comprehensive metabolic panel (BMP + Alb, Alk Phos, ALT, AST, Total. Bili, TP)     Comprehensive metabolic panel (BMP + Alb, Alk Phos, ALT, AST, Total. Bili, TP)      5. Prediabetes  R73.03 Hemoglobin A1c     Hemoglobin A1c      6. Screening for prostate cancer  Z12.5 PSA, screen     PSA, screen        Patient here for annual physical exam.  Following issues addressed  1: Hyperlipidemia: On atorvastatin.  Significant today.  Continue on statin.  2: Essential hypertension: Home readings are between 110-130/80 to 90 mmHg.  Clinically was much better.  Discussed checking blood pressure when rested and if most numbers are less than 110/70 or patient has any symptoms of low blood pressure then we can decrease the medication.  Currently patient is asymptomatic and tolerating medication well.  3: Prediabetes: Check A1c today.      BMI  Estimated body mass index is 27.27 kg/m  as calculated from the following:    Height as of this encounter: 1.74 m (5' 8.5\").    Weight as of this encounter: 82.6 kg (182 lb).   Weight management plan: Discussed healthy diet and exercise guidelines    Counseling  Appropriate preventive services were discussed with this patient, including applicable screening as appropriate for fall prevention, nutrition, physical activity, Tobacco-use cessation, weight loss and cognition.  Checklist reviewing preventive services available has been given to the patient.  Reviewed patient's diet, " addressing concerns and/or questions.   He is at risk for psychosocial distress and has been provided with information to reduce risk.       MEDICATIONS:  Continue current medications without change  Work on weight loss  Regular exercise  See Patient Instructions    Subjective   Richard is a 63 year old, presenting for the following:  Physical (Pt is fasting today, pt brought along blood pressure readings from home, wants to talk about blood pressure medication. )        4/25/2024     8:08 AM   Additional Questions   Roomed by Kim David CMA        Health Care Directive  Patient does not have a Health Care Directive or Living Will: Patient states has Advance Directive and will bring in a copy to clinic.    HPI        4/25/2024   General Health   How would you rate your overall physical health? Excellent   Feel stress (tense, anxious, or unable to sleep) Only a little   (!) STRESS CONCERN      4/25/2024   Nutrition   Three or more servings of calcium each day? Yes   Diet: Regular (no restrictions)   How many servings of fruit and vegetables per day? (!) 2-3   How many sweetened beverages each day? 0-1         4/25/2024   Exercise   Days per week of moderate/strenous exercise 6 days   Average minutes spent exercising at this level 90 min         4/25/2024   Social Factors   Frequency of gathering with friends or relatives Three times a week   Worry food won't last until get money to buy more No   Food not last or not have enough money for food? No   Do you have housing?  Yes   Are you worried about losing your housing? No   Lack of transportation? No   Unable to get utilities (heat,electricity)? No         4/25/2024   Fall Risk   Fallen 2 or more times in the past year? No   Trouble with walking or balance? No          4/25/2024   Dental   Dentist two times every year? Yes         4/25/2024   TB Screening   Were you born outside of the [place]? No         Today's PHQ-2 Score:       4/25/2024     8:01 AM   PHQ-2 ( 1999  [employer])   Q1: Little interest or pleasure in doing things 0   Q2: Feeling down, depressed or hopeless 0   PHQ-2 Score 0   Q1: Little interest or pleasure in doing things Not at all   Q2: Feeling down, depressed or hopeless Not at all   PHQ-2 Score 0           4/25/2024   Substance Use   Alcohol more than 3/day or more than 7/wk No   Do you use any other substances recreationally? No     Social History     Tobacco Use    Smoking status: Never    Smokeless tobacco: Never   Substance Use Topics    Alcohol use: Yes     Alcohol/week: 1.7 standard drinks of alcohol    Drug use: No             4/25/2024   One time HIV Screening   Previous HIV test? No         4/25/2024   STI Screening   New sexual partner(s) since last STI/HIV test? No   Last PSA:   Prostate Specific Antigen Screen   Date Value Ref Range Status   03/13/2023 0.52 0.00 - 4.50 ng/mL Final   03/11/2022 0.49 0.00 - 4.50 ug/L Final     ASCVD Risk   The 10-year ASCVD risk score (Anahi WHITFIELD, et al., 2019) is: 10.7%    Values used to calculate the score:      Age: 63 years      Sex: Male      Is Non- : No      Diabetic: No      Tobacco smoker: No      Systolic Blood Pressure: 133 mmHg      Is BP treated: Yes      HDL Cholesterol: 58 mg/dL      Total Cholesterol: 184 mg/dL           Reviewed and updated as needed this visit by Provider                    Past Medical History:   Diagnosis Date    Hernia, ventral 10/19/2017     Past Surgical History:   Procedure Laterality Date    REMOVAL OF SPERM DUCT(S)      Description: Surgery Of Male Genitalia Vasectomy;  Recorded: 11/17/2008;    WISDOM TOOTH EXTRACTION       BP Readings from Last 3 Encounters:   04/25/24 133/80   03/13/23 124/77   03/11/22 131/77    Wt Readings from Last 3 Encounters:   04/25/24 82.6 kg (182 lb)   03/13/23 88.5 kg (195 lb)   03/11/22 90.4 kg (199 lb 6.4 oz)                  Patient Active Problem List   Diagnosis    Hypercholesterolemia    Essential hypertension,  "benign    Overweight    External hemorrhoids    Impaired fasting glucose    Metabolic syndrome X    Encounter for general adult medical examination with abnormal findings    Bradycardia    Benign neoplasm of cecum     Past Surgical History:   Procedure Laterality Date    REMOVAL OF SPERM DUCT(S)      Description: Surgery Of Male Genitalia Vasectomy;  Recorded: 11/17/2008;    WISDOM TOOTH EXTRACTION         Social History     Tobacco Use    Smoking status: Never    Smokeless tobacco: Never   Substance Use Topics    Alcohol use: Yes     Alcohol/week: 1.7 standard drinks of alcohol     Family History   Problem Relation Age of Onset    Hypertension Mother     Prostate Cancer Father     Hypertension Father     Skin Cancer Father     Diabetes Type 2  Son     Lymphoma Brother         non hodgkins          Current Outpatient Medications   Medication Sig Dispense Refill    atorvastatin (LIPITOR) 20 MG tablet TAKE 1 TABLET BY MOUTH EVERY NIGHT AT BEDTIME 90 tablet 3    losartan-hydrochlorothiazide (HYZAAR) 50-12.5 MG tablet Take 1 tablet by mouth 2 times daily 180 tablet 3         Review of Systems  Constitutional, neuro, ENT, endocrine, pulmonary, cardiac, gastrointestinal, genitourinary, musculoskeletal, integument and psychiatric systems are negative, except as otherwise noted.     Objective    Exam  /80 (BP Location: Left arm, Patient Position: Sitting, Cuff Size: Adult Regular)   Pulse (!) 46   Resp 16   Ht 1.74 m (5' 8.5\")   Wt 82.6 kg (182 lb)   SpO2 96%   BMI 27.27 kg/m     Estimated body mass index is 27.27 kg/m  as calculated from the following:    Height as of this encounter: 1.74 m (5' 8.5\").    Weight as of this encounter: 82.6 kg (182 lb).    Physical Exam  GENERAL: alert and no distress  NECK: no adenopathy, no asymmetry, masses, or scars  RESP: lungs clear to auscultation - no rales, rhonchi or wheezes  CV: regular rate and rhythm, normal S1 S2, no S3 or S4, no murmur, click or rub, no peripheral " edema  ABDOMEN: soft, nontender, no hepatosplenomegaly, no masses and bowel sounds normal  MS: no gross musculoskeletal defects noted, no edema        Signed Electronically by: Ester Rich MD

## 2024-04-25 NOTE — PATIENT INSTRUCTIONS
Preventive Care Advice   This is general advice given by our system to help you stay healthy. However, your care team may have specific advice just for you. Please talk to your care team about your preventive care needs.  Nutrition  Eat 5 or more servings of fruits and vegetables each day.  Try wheat bread, brown rice and whole grain pasta (instead of white bread, rice, and pasta).  Get enough calcium and vitamin D. Check the label on foods and aim for 100% of the RDA (recommended daily allowance).  Lifestyle  Exercise at least 150 minutes each week   (30 minutes a day, 5 days a week).  Do muscle strengthening activities 2 days a week. These help control your weight and prevent disease.  No smoking.  Wear sunscreen to prevent skin cancer.  Have a dental exam and cleaning every 6 months.  Yearly exams  See your health care team every year to talk about:  Any changes in your health.  Any medicines your care team has prescribed.  Preventive care, family planning, and ways to prevent chronic diseases.  Shots (vaccines)   HPV shots (up to age 26), if you've never had them before.  Hepatitis B shots (up to age 59), if you've never had them before.  COVID-19 shot: Get this shot when it's due.  Flu shot: Get a flu shot every year.  Tetanus shot: Get a tetanus shot every 10 years.  Pneumococcal, hepatitis A, and RSV shots: Ask your care team if you need these based on your risk.  Shingles shot (for age 50 and up).  General health tests  Diabetes screening:  Starting at age 35, Get screened for diabetes at least every 3 years.  If you are younger than age 35, ask your care team if you should be screened for diabetes.  Cholesterol test: At age 39, start having a cholesterol test every 5 years, or more often if advised.  Bone density scan (DEXA): At age 50, ask your care team if you should have this scan for osteoporosis (brittle bones).  Hepatitis C: Get tested at least once in your life.  STIs (sexually transmitted  infections)  Before age 24: Ask your care team if you should be screened for STIs.  After age 24: Get screened for STIs if you're at risk. You are at risk for STIs (including HIV) if:  You are sexually active with more than one person.  You don't use condoms every time.  You or a partner was diagnosed with a sexually transmitted infection.  If you are at risk for HIV, ask about PrEP medicine to prevent HIV.  Get tested for HIV at least once in your life, whether you are at risk for HIV or not.  Cancer screening tests  Cervical cancer screening: If you have a cervix, begin getting regular cervical cancer screening tests at age 21. Most people who have regular screenings with normal results can stop after age 65. Talk about this with your provider.  Breast cancer scan (mammogram): If you've ever had breasts, begin having regular mammograms starting at age 40. This is a scan to check for breast cancer.  Colon cancer screening: It is important to start screening for colon cancer at age 45.  Have a colonoscopy test every 10 years (or more often if you're at risk) Or, ask your provider about stool tests like a FIT test every year or Cologuard test every 3 years.  To learn more about your testing options, visit: https://www.Orthodata/825039.pdf.  For help making a decision, visit: https://bit.ly/mb56258.  Prostate cancer screening test: If you have a prostate and are age 55 to 69, ask your provider if you would benefit from a yearly prostate cancer screening test.  Lung cancer screening: If you are a current or former smoker age 50 to 80, ask your care team if ongoing lung cancer screenings are right for you.  For informational purposes only. Not to replace the advice of your health care provider. Copyright   2023 [employer]. All rights reserved. Clinically reviewed by the [facility]. [facility] 805385 - REV 01/24.    Learning About Stress  What is stress?     Stress is your  body's response to a hard situation. Your body can have a physical, emotional, or mental response. Stress is a fact of life for most people, and it affects everyone differently. What causes stress for you may not be stressful for someone else.  A lot of things can cause stress. You may feel stress when you go on a job interview, take a test, or run a race. This kind of short-term stress is normal and even useful. It can help you if you need to work hard or react quickly. For example, stress can help you finish an important job on time.  Long-term stress is caused by ongoing stressful situations or events. Examples of long-term stress include long-term health problems, ongoing problems at work, or conflicts in your family. Long-term stress can harm your health.  How does stress affect your health?  When you are stressed, your body responds as though you are in danger. It makes hormones that speed up your heart, make you breathe faster, and give you a burst of energy. This is called the fight-or-flight stress response. If the stress is over quickly, your body goes back to normal and no harm is done.  But if stress happens too often or lasts too long, it can have bad effects. Long-term stress can make you more likely to get sick, and it can make symptoms of some diseases worse. If you tense up when you are stressed, you may develop neck, shoulder, or low back pain. Stress is linked to high blood pressure and heart disease.  Stress also harms your emotional health. It can make you ramos, tense, or depressed. Your relationships may suffer, and you may not do well at work or school.  What can you do to manage stress?  You can try these things to help manage stress:   Do something active. Exercise or activity can help reduce stress. Walking is a great way to get started. Even everyday activities such as housecleaning or yard work can help.  Try yoga or zack chi. These techniques combine exercise and meditation. You may need  some training at first to learn them.  Do something you enjoy. For example, listen to music or go to a movie. Practice your hobby or do volunteer work.  Meditate. This can help you relax, because you are not worrying about what happened before or what may happen in the future.  Do guided imagery. Imagine yourself in any setting that helps you feel calm. You can use online videos, books, or a teacher to guide you.  Do breathing exercises. For example:  From a standing position, bend forward from the waist with your knees slightly bent. Let your arms dangle close to the floor.  Breathe in slowly and deeply as you return to a standing position. Roll up slowly and lift your head last.  Hold your breath for just a few seconds in the standing position.  Breathe out slowly and bend forward from the waist.  Let your feelings out. Talk, laugh, cry, and express anger when you need to. Talking with supportive friends or family, a counselor, or a nancy leader about your feelings is a healthy way to relieve stress. Avoid discussing your feelings with people who make you feel worse.  Write. It may help to write about things that are bothering you. This helps you find out how much stress you feel and what is causing it. When you know this, you can find better ways to cope.  What can you do to prevent stress?  You might try some of these things to help prevent stress:  Manage your time. This helps you find time to do the things you want and need to do.  Get enough sleep. Your body recovers from the stresses of the day while you are sleeping.  Get support. Your family, friends, and community can make a difference in how you experience stress.  Limit your news feed. Avoid or limit time on social media or news that may make you feel stressed.  Do something active. Exercise or activity can help reduce stress. Walking is a great way to get started.  Where can you learn more?  Go to https://www.healthwise.net/patiented  Enter N032 in the  "search box to learn more about \"Learning About Stress.\"  Current as of: October 24, 2023               Content Version: 14.0    1381-0573 [employer].   Care instructions adapted under license by your healthcare professional. If you have questions about a medical condition or this instruction, always ask your healthcare professional. [employer] disclaims any warranty or liability for your use of this information.      "

## 2024-04-26 LAB
ALBUMIN SERPL BCG-MCNC: 4.6 G/DL (ref 3.5–5.2)
ALP SERPL-CCNC: 45 U/L (ref 40–150)
ALT SERPL W P-5'-P-CCNC: 23 U/L (ref 0–70)
ANION GAP SERPL CALCULATED.3IONS-SCNC: 11 MMOL/L (ref 7–15)
AST SERPL W P-5'-P-CCNC: 25 U/L (ref 0–45)
BILIRUB SERPL-MCNC: 1.2 MG/DL
BUN SERPL-MCNC: 16.9 MG/DL (ref 8–23)
CALCIUM SERPL-MCNC: 9.2 MG/DL (ref 8.8–10.2)
CHLORIDE SERPL-SCNC: 103 MMOL/L (ref 98–107)
CHOLEST SERPL-MCNC: 176 MG/DL
CREAT SERPL-MCNC: 0.8 MG/DL (ref 0.67–1.17)
DEPRECATED HCO3 PLAS-SCNC: 25 MMOL/L (ref 22–29)
EGFRCR SERPLBLD CKD-EPI 2021: >90 ML/MIN/1.73M2
FASTING STATUS PATIENT QL REPORTED: NORMAL
GLUCOSE SERPL-MCNC: 104 MG/DL (ref 70–99)
HDLC SERPL-MCNC: 71 MG/DL
HIV 1+2 AB+HIV1 P24 AG SERPL QL IA: NONREACTIVE
LDLC SERPL CALC-MCNC: 86 MG/DL
NONHDLC SERPL-MCNC: 105 MG/DL
POTASSIUM SERPL-SCNC: 3.7 MMOL/L (ref 3.4–5.3)
PROT SERPL-MCNC: 7.1 G/DL (ref 6.4–8.3)
PSA SERPL DL<=0.01 NG/ML-MCNC: 0.56 NG/ML (ref 0–4.5)
SODIUM SERPL-SCNC: 139 MMOL/L (ref 135–145)
TRIGL SERPL-MCNC: 96 MG/DL

## 2024-06-21 DIAGNOSIS — E78.2 MIXED HYPERLIPIDEMIA: ICD-10-CM

## 2024-06-24 RX ORDER — ATORVASTATIN CALCIUM 20 MG/1
TABLET, FILM COATED ORAL
Qty: 90 TABLET | Refills: 2 | Status: SHIPPED | OUTPATIENT
Start: 2024-06-24

## 2025-04-23 SDOH — HEALTH STABILITY: PHYSICAL HEALTH: ON AVERAGE, HOW MANY MINUTES DO YOU ENGAGE IN EXERCISE AT THIS LEVEL?: 90 MIN

## 2025-04-23 SDOH — HEALTH STABILITY: PHYSICAL HEALTH: ON AVERAGE, HOW MANY DAYS PER WEEK DO YOU ENGAGE IN MODERATE TO STRENUOUS EXERCISE (LIKE A BRISK WALK)?: 6 DAYS

## 2025-04-23 ASSESSMENT — SOCIAL DETERMINANTS OF HEALTH (SDOH): HOW OFTEN DO YOU GET TOGETHER WITH FRIENDS OR RELATIVES?: ONCE A WEEK

## 2025-04-28 ENCOUNTER — OFFICE VISIT (OUTPATIENT)
Dept: FAMILY MEDICINE | Facility: CLINIC | Age: 65
End: 2025-04-28
Payer: COMMERCIAL

## 2025-04-28 VITALS
TEMPERATURE: 98.5 F | RESPIRATION RATE: 14 BRPM | SYSTOLIC BLOOD PRESSURE: 138 MMHG | OXYGEN SATURATION: 97 % | HEART RATE: 45 BPM | DIASTOLIC BLOOD PRESSURE: 80 MMHG | HEIGHT: 69 IN | BODY MASS INDEX: 26.98 KG/M2 | WEIGHT: 182.2 LBS

## 2025-04-28 DIAGNOSIS — Z12.5 SCREENING PSA (PROSTATE SPECIFIC ANTIGEN): ICD-10-CM

## 2025-04-28 DIAGNOSIS — I10 ESSENTIAL HYPERTENSION, BENIGN: ICD-10-CM

## 2025-04-28 DIAGNOSIS — R73.09 ELEVATED GLUCOSE LEVEL: ICD-10-CM

## 2025-04-28 DIAGNOSIS — E78.00 PURE HYPERCHOLESTEROLEMIA: ICD-10-CM

## 2025-04-28 DIAGNOSIS — Z13.6 SCREENING FOR CARDIOVASCULAR CONDITION: ICD-10-CM

## 2025-04-28 DIAGNOSIS — Z00.00 ROUTINE GENERAL MEDICAL EXAMINATION AT A HEALTH CARE FACILITY: Primary | ICD-10-CM

## 2025-04-28 DIAGNOSIS — R42 VERTIGO: ICD-10-CM

## 2025-04-28 DIAGNOSIS — R00.1 BRADYCARDIA: ICD-10-CM

## 2025-04-28 LAB
ALBUMIN SERPL BCG-MCNC: 4.4 G/DL (ref 3.5–5.2)
ALP SERPL-CCNC: 53 U/L (ref 40–150)
ALT SERPL W P-5'-P-CCNC: 21 U/L (ref 0–70)
ANION GAP SERPL CALCULATED.3IONS-SCNC: 11 MMOL/L (ref 7–15)
AST SERPL W P-5'-P-CCNC: 25 U/L (ref 0–45)
BASOPHILS # BLD AUTO: 0 10E3/UL (ref 0–0.2)
BASOPHILS NFR BLD AUTO: 1 %
BILIRUB SERPL-MCNC: 1.1 MG/DL
BUN SERPL-MCNC: 17.2 MG/DL (ref 8–23)
CALCIUM SERPL-MCNC: 9.2 MG/DL (ref 8.8–10.4)
CHLORIDE SERPL-SCNC: 106 MMOL/L (ref 98–107)
CHOLEST SERPL-MCNC: 178 MG/DL
CREAT SERPL-MCNC: 0.88 MG/DL (ref 0.67–1.17)
EGFRCR SERPLBLD CKD-EPI 2021: >90 ML/MIN/1.73M2
EOSINOPHIL # BLD AUTO: 0.2 10E3/UL (ref 0–0.7)
EOSINOPHIL NFR BLD AUTO: 4 %
ERYTHROCYTE [DISTWIDTH] IN BLOOD BY AUTOMATED COUNT: 12.9 % (ref 10–15)
FASTING STATUS PATIENT QL REPORTED: YES
FASTING STATUS PATIENT QL REPORTED: YES
GLUCOSE SERPL-MCNC: 118 MG/DL (ref 70–99)
HCO3 SERPL-SCNC: 26 MMOL/L (ref 22–29)
HCT VFR BLD AUTO: 42.7 % (ref 40–53)
HDLC SERPL-MCNC: 72 MG/DL
HGB BLD-MCNC: 14.8 G/DL (ref 13.3–17.7)
IMM GRANULOCYTES # BLD: 0 10E3/UL
IMM GRANULOCYTES NFR BLD: 0 %
LDLC SERPL CALC-MCNC: 85 MG/DL
LYMPHOCYTES # BLD AUTO: 1.9 10E3/UL (ref 0.8–5.3)
LYMPHOCYTES NFR BLD AUTO: 38 %
MCH RBC QN AUTO: 31 PG (ref 26.5–33)
MCHC RBC AUTO-ENTMCNC: 34.7 G/DL (ref 31.5–36.5)
MCV RBC AUTO: 90 FL (ref 78–100)
MONOCYTES # BLD AUTO: 0.5 10E3/UL (ref 0–1.3)
MONOCYTES NFR BLD AUTO: 11 %
NEUTROPHILS # BLD AUTO: 2.3 10E3/UL (ref 1.6–8.3)
NEUTROPHILS NFR BLD AUTO: 46 %
NONHDLC SERPL-MCNC: 106 MG/DL
PLATELET # BLD AUTO: 222 10E3/UL (ref 150–450)
POTASSIUM SERPL-SCNC: 3.8 MMOL/L (ref 3.4–5.3)
PROT SERPL-MCNC: 7.1 G/DL (ref 6.4–8.3)
PSA SERPL DL<=0.01 NG/ML-MCNC: 0.7 NG/ML (ref 0–4.5)
RBC # BLD AUTO: 4.77 10E6/UL (ref 4.4–5.9)
SODIUM SERPL-SCNC: 143 MMOL/L (ref 135–145)
TRIGL SERPL-MCNC: 105 MG/DL
WBC # BLD AUTO: 5 10E3/UL (ref 4–11)

## 2025-04-28 PROCEDURE — 80061 LIPID PANEL: CPT | Performed by: FAMILY MEDICINE

## 2025-04-28 PROCEDURE — 85025 COMPLETE CBC W/AUTO DIFF WBC: CPT | Performed by: FAMILY MEDICINE

## 2025-04-28 PROCEDURE — 83036 HEMOGLOBIN GLYCOSYLATED A1C: CPT | Performed by: FAMILY MEDICINE

## 2025-04-28 PROCEDURE — 3079F DIAST BP 80-89 MM HG: CPT | Performed by: FAMILY MEDICINE

## 2025-04-28 PROCEDURE — 99214 OFFICE O/P EST MOD 30 MIN: CPT | Mod: 25 | Performed by: FAMILY MEDICINE

## 2025-04-28 PROCEDURE — 36415 COLL VENOUS BLD VENIPUNCTURE: CPT | Performed by: FAMILY MEDICINE

## 2025-04-28 PROCEDURE — 3075F SYST BP GE 130 - 139MM HG: CPT | Performed by: FAMILY MEDICINE

## 2025-04-28 PROCEDURE — 80053 COMPREHEN METABOLIC PANEL: CPT | Performed by: FAMILY MEDICINE

## 2025-04-28 PROCEDURE — G0103 PSA SCREENING: HCPCS | Performed by: FAMILY MEDICINE

## 2025-04-28 PROCEDURE — 99396 PREV VISIT EST AGE 40-64: CPT | Performed by: FAMILY MEDICINE

## 2025-04-28 PROCEDURE — G2211 COMPLEX E/M VISIT ADD ON: HCPCS | Performed by: FAMILY MEDICINE

## 2025-04-28 NOTE — PROGRESS NOTES
Appointment Request (HM)    BIANCA Valle  Nurse Msg Pool 58 minutes ago (7:54 AM)         Hello,     Can an order be placed for patient?     Message text      Parker Araiza  Patient  Schedule Request Pool Yesterday (8:51 AM)           Can we just place the order  Thank you         Elle Grace Patrick O 4 days ago     TYESHA Canales, I can place a message with the provider to place a order for a colonoscopy. One the order is placed you can call [phone] to get it scheduled. Does that sound ok, or do you want to see  the provider?          Parker Araiza  Patient  Schedule Request Pool 4 days ago           Anytime would be fine.  I need to get my Colorectal Screening that's why i would prefer a Friday         Russ Felix Patrick O 4 days ago     PX      Hello, I don't see that Dr. Beatty has any opening on Friday. She is booked out the whole Friday of February and March. Would you like for me to look and see if her SHANIKA Almeida or another provider at this clinic has any opening?          Parker Araiza  Patient  Schedule Request Pool 4 days ago           Appointment Request From: Parker Araiza     With Provider: Dena Beatty MD [-Primary Care Physician-]     Preferred Date Range: Any date 2/11/2022 or later     Preferred Times: Any     Reason: To address the following health maintenance concerns.  Colorectal Cancer Screen-     Comments:  Any Friday at any time is best     Preventive Care Visit  [facility]  Ester Rich MD, Family Medicine  Apr 28, 2025      Assessment & Plan     ICD-10-CM    1. Routine general medical examination at a health care facility  Z00.00 CBC with platelets and differential     CBC with platelets and differential      2. Essential hypertension, benign  I10 Comprehensive metabolic panel (BMP + Alb, Alk Phos, ALT, AST, Total. Bili, TP)     Comprehensive metabolic panel (BMP + Alb, Alk Phos, ALT, AST, Total. Bili, TP)      3. Screening for cardiovascular condition  Z13.6       4. Pure hypercholesterolemia  E78.00 Lipid panel reflex to direct LDL Fasting     Lipid panel reflex to direct LDL Fasting      5. Screening PSA (prostate specific antigen)  Z12.5 Prostate Specific Antigen Screen     Prostate Specific Antigen Screen      6. Bradycardia  R00.1 EKG 12-lead, tracing only     Echocardiogram Complete      7. Vertigo  R42 Echocardiogram Complete        Presents today for routine physical exam.  Following issues addressed  1: Essential hypertension: He was on losartan-hydrochlorothiazide 50/12.5 twice daily.  Because of noted low blood pressure and dizziness, this was decreased to once a day.  Subsequently after 10 months he noted some high blood pressure manage episode of vertigo and increase it to again twice a day.  He brings numbers from January until now which are in fair range.  I would recommend that he continue on the same dose but if low blood pressure and most of the blood pressure less than 120/80 mmHg or if he is symptomatic with low blood pressure then he can go back to once a day.  2: Hyperlipidemia: Has been on statin at least since 51 years of age and is tolerating it well with numbers in fair range.  Continue medication and check fasting lipid panel as above  3: Bradycardia: Noted to have bradycardia and denies any daily symptoms.  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  At the time of documentation, I reviewed bradycardia workup.  Mostly is asymptomatic but with concerns of dizziness in the past and vertigo recently, we will recommend pursuing an EKG and echocardiogram as a starting point.  I will send message to the patient and have him follow-up regarding this.  Incidentally, he does inform me that his dad always had bradycardia but now has atrial fibrillation.  Both parents are alive in their 90s.    Patient is retired  and now keeps himself busy with his [place] and yard work.    BMI  Estimated body mass index is 27.3 kg/m  as calculated from the following:    Height as of this encounter: 1.74 m (5' 8.5\").    Weight as of this encounter: 82.6 kg (182 lb 3.2 oz).   Weight management plan: Discussed healthy diet and exercise guidelines    Counseling  Appropriate preventive services were addressed with this patient via screening, questionnaire, or discussion as appropriate for fall prevention, nutrition, physical activity, Tobacco-use cessation, social engagement, weight loss and cognition.  Checklist reviewing preventive services available has been given to the patient.  Reviewed patient's diet, addressing concerns and/or questions.       Follow-up    Follow-up Visit   Expected date:  Apr 28, 2026 (Approximate)      Follow Up Appointment Details:     Follow-up with whom?: PCP    Follow-Up for what?: Adult Preventive    How?: In Person                 Damon Ramos is a 64 year old, presenting for the following:  Physical (Pt is fasting today, blood pressure- wants to talk about this and the medication. )        4/28/2025     9:45 AM   Additional Questions   Roomed by Kim David CMA          HPI       Advance Care Planning    Patient states has Health Care Directive and will send to [employer].        4/23/2025   General Health   How would you rate your overall physical health? Good   Feel stress (tense, " anxious, or unable to sleep) Only a little   (!) STRESS CONCERN      4/23/2025   Nutrition   Three or more servings of calcium each day? Yes   Diet: Regular (no restrictions)   How many servings of fruit and vegetables per day? (!) 2-3   How many sweetened beverages each day? 0-1         4/23/2025   Exercise   Days per week of moderate/strenous exercise 6 days   Average minutes spent exercising at this level 90 min         4/23/2025   Social Factors   Frequency of gathering with friends or relatives Once a week   Worry food won't last until get money to buy more No   Food not last or not have enough money for food? No   Do you have housing? (Housing is defined as stable permanent housing and does not include staying outside in a car, in a tent, in an abandoned building, in an overnight shelter, or couch-surfing.) Yes   Are you worried about losing your housing? No   Lack of transportation? No   Unable to get utilities (heat,electricity)? No         4/23/2025   Fall Risk   Fallen 2 or more times in the past year? No   Trouble with walking or balance? No          4/23/2025   Dental   Dentist two times every year? Yes         Today's PHQ-2 Score:       4/28/2025     9:40 AM   PHQ-2 ( 1999 [employer])   Q1: Little interest or pleasure in doing things 0   Q2: Feeling down, depressed or hopeless 0   PHQ-2 Score 0    Q1: Little interest or pleasure in doing things Not at all   Q2: Feeling down, depressed or hopeless Not at all   PHQ-2 Score 0       Patient-reported           4/23/2025   Substance Use   Alcohol more than 3/day or more than 7/wk No   Do you use any other substances recreationally? No     Social History     Tobacco Use    Smoking status: Never    Smokeless tobacco: Never   Substance Use Topics    Alcohol use: Yes     Alcohol/week: 1.7 standard drinks of alcohol    Drug use: No           4/23/2025   STI Screening   New sexual partner(s) since last STI/HIV test? No   Last PSA:   Prostate Specific Antigen Screen    Date Value Ref Range Status   04/25/2024 0.56 0.00 - 4.50 ng/mL Final   03/11/2022 0.49 0.00 - 4.50 ug/L Final     ASCVD Risk   The 10-year ASCVD risk score (Anahi WHITFIELD, et al., 2019) is: 10.5%    Values used to calculate the score:      Age: 64 years      Sex: Male      Is Non- : No      Diabetic: No      Tobacco smoker: No      Systolic Blood Pressure: 138 mmHg      Is BP treated: Yes      HDL Cholesterol: 71 mg/dL      Total Cholesterol: 176 mg/dL           Reviewed and updated as needed this visit by Provider   Tobacco  Allergies  Meds  Problems  Med Hx  Surg Hx  Fam Hx            Past Medical History:   Diagnosis Date    Hernia, ventral 10/19/2017     Past Surgical History:   Procedure Laterality Date    REMOVAL OF SPERM DUCT(S)      Description: Surgery Of Male Genitalia Vasectomy;  Recorded: 11/17/2008;    WISDOM TOOTH EXTRACTION       BP Readings from Last 3 Encounters:   04/28/25 138/80   04/25/24 112/73   03/13/23 124/77    Wt Readings from Last 3 Encounters:   04/28/25 82.6 kg (182 lb 3.2 oz)   04/25/24 82.6 kg (182 lb)   03/13/23 88.5 kg (195 lb)                  Patient Active Problem List   Diagnosis    Hypercholesterolemia    Essential hypertension, benign    Overweight    External hemorrhoids    Impaired fasting glucose    Metabolic syndrome X    Encounter for general adult medical examination with abnormal findings    Bradycardia    Benign neoplasm of cecum     Past Surgical History:   Procedure Laterality Date    REMOVAL OF SPERM DUCT(S)      Description: Surgery Of Male Genitalia Vasectomy;  Recorded: 11/17/2008;    WISDOM TOOTH EXTRACTION         Social History     Tobacco Use    Smoking status: Never    Smokeless tobacco: Never   Substance Use Topics    Alcohol use: Yes     Alcohol/week: 1.7 standard drinks of alcohol     Family History   Problem Relation Age of Onset    Hypertension Mother     Prostate Cancer Father     Hypertension Father     Skin  "Cancer Father     Diabetes Type 2  Son     Lymphoma Brother         non hodgkins          Current Outpatient Medications   Medication Sig Dispense Refill    atorvastatin (LIPITOR) 20 MG tablet TAKE 1 TABLET BY MOUTH EVERY NIGHT AT BEDTIME 90 tablet 2    losartan-hydrochlorothiazide (HYZAAR) 50-12.5 MG tablet Take 1 tablet by mouth 2 times daily. 180 tablet 3         Review of Systems  Constitutional, neuro, ENT, endocrine, pulmonary, cardiac, gastrointestinal, genitourinary, musculoskeletal, integument and psychiatric systems are negative, except as otherwise noted.     Objective    Exam  /80   Pulse (!) 45   Temp 98.5  F (36.9  C) (Oral)   Resp 14   Ht 1.74 m (5' 8.5\")   Wt 82.6 kg (182 lb 3.2 oz)   SpO2 97%   BMI 27.30 kg/m     Estimated body mass index is 27.3 kg/m  as calculated from the following:    Height as of this encounter: 1.74 m (5' 8.5\").    Weight as of this encounter: 82.6 kg (182 lb 3.2 oz).    Physical Exam  GENERAL: alert and no distress  EYES: Eyes grossly normal to inspection, PERRL and conjunctivae and sclerae normal  HENT: ear canals and TM's normal, nose and mouth without ulcers or lesions  NECK: no adenopathy, no asymmetry, masses, or scars  RESP: lungs clear to auscultation - no rales, rhonchi or wheezes  CV: regular rate and rhythm, normal S1 S2, no S3 or S4, no murmur, click or rub, no peripheral edema  ABDOMEN: soft, nontender, no hepatosplenomegaly, no masses and bowel sounds normal  MS: no gross musculoskeletal defects noted, no edema        Signed Electronically by: Ester Rich MD    "

## 2025-04-29 DIAGNOSIS — R73.09 ELEVATED GLUCOSE LEVEL: Primary | ICD-10-CM

## 2025-04-29 LAB
EST. AVERAGE GLUCOSE BLD GHB EST-MCNC: 114 MG/DL
HBA1C MFR BLD: 5.6 % (ref 0–5.6)

## 2025-05-22 ENCOUNTER — ALLIED HEALTH/NURSE VISIT (OUTPATIENT)
Dept: FAMILY MEDICINE | Facility: CLINIC | Age: 65
End: 2025-05-22
Payer: COMMERCIAL

## 2025-05-22 ENCOUNTER — OFFICE VISIT (OUTPATIENT)
Dept: FAMILY MEDICINE | Facility: CLINIC | Age: 65
End: 2025-05-22
Payer: COMMERCIAL

## 2025-05-22 DIAGNOSIS — R00.1 BRADYCARDIA: ICD-10-CM

## 2025-05-22 DIAGNOSIS — R94.31 ABNORMAL ELECTROCARDIOGRAM: Primary | ICD-10-CM

## 2025-05-22 LAB
ATRIAL RATE - MUSE: 68 BPM
DIASTOLIC BLOOD PRESSURE - MUSE: NORMAL MMHG
INTERPRETATION ECG - MUSE: NORMAL
P AXIS - MUSE: 19 DEGREES
PR INTERVAL - MUSE: 168 MS
QRS DURATION - MUSE: 102 MS
QT - MUSE: 378 MS
QTC - MUSE: 401 MS
R AXIS - MUSE: 23 DEGREES
SYSTOLIC BLOOD PRESSURE - MUSE: NORMAL MMHG
T AXIS - MUSE: -9 DEGREES
VENTRICULAR RATE- MUSE: 68 BPM

## 2025-05-22 NOTE — PROGRESS NOTES
Discussed EKG.  Follow-up with echocardiogram per previous plan    Signed Electronically by: Ester Rich MD

## 2025-05-22 NOTE — PROGRESS NOTES
Patient came in for RN visit for EKG with standing order from PCP.   Patient tolerated visit well. EKG rhythm strip was provided to PCP for review      Michelle Llamas RN

## 2025-05-27 ENCOUNTER — RESULTS FOLLOW-UP (OUTPATIENT)
Dept: FAMILY MEDICINE | Facility: CLINIC | Age: 65
End: 2025-05-27

## 2025-06-05 ENCOUNTER — HOSPITAL ENCOUNTER (OUTPATIENT)
Dept: CARDIOLOGY | Facility: HOSPITAL | Age: 65
End: 2025-06-05
Attending: FAMILY MEDICINE
Payer: COMMERCIAL

## 2025-06-05 DIAGNOSIS — R00.1 BRADYCARDIA: ICD-10-CM

## 2025-06-05 DIAGNOSIS — R42 VERTIGO: ICD-10-CM

## 2025-06-05 LAB — LVEF ECHO: NORMAL

## 2025-06-05 PROCEDURE — 93306 TTE W/DOPPLER COMPLETE: CPT
